# Patient Record
Sex: MALE | Race: WHITE | NOT HISPANIC OR LATINO | Employment: FULL TIME | URBAN - METROPOLITAN AREA
[De-identification: names, ages, dates, MRNs, and addresses within clinical notes are randomized per-mention and may not be internally consistent; named-entity substitution may affect disease eponyms.]

---

## 2017-03-06 DIAGNOSIS — I49.3 PVC'S (PREMATURE VENTRICULAR CONTRACTIONS): ICD-10-CM

## 2017-03-06 DIAGNOSIS — E78.2 MIXED HYPERLIPIDEMIA: ICD-10-CM

## 2017-03-06 NOTE — TELEPHONE ENCOUNTER
Routing refill request to provider for review/approval because:  Labs not current:  [facility] 2015    Thank you  Nannette GUZMAN RN

## 2017-03-06 NOTE — TELEPHONE ENCOUNTER
Metoprolol    Last Written Prescription Date: 03/16/16  Last Fill Quantity: 90, # refills: 3    Last Office Visit with [facility], [facility] or [facility] prescribing provider:  11/17/16   Future Office Visit:    Next 5 appointments (look out 90 days)     Mar 07, 2017 10:30 AM CST   Return Visit with VIKY Diaz MD   [facility] ([facility]    [address] [address]   [phone]                    BP Readings from Last 3 Encounters:   11/17/16 130/81   09/14/16 134/80   08/29/16 130/80     Atorvastatin         Last Written Prescription Date: 03/16/16  Last Fill Quantity: 90, # refills: 3    Last Office Visit with [facility], [facility] or [facility] prescribing provider:  11/17/16   Future Office Visit:    Next 5 appointments (look out 90 days)     Mar 07, 2017 10:30 AM CST   Return Visit with VIKY Diaz MD   [facility] ([facility]    [address] [address]   [phone]                  BP Readings from Last 3 Encounters:   11/17/16 130/81   09/14/16 134/80   08/29/16 130/80     Lab Results   Component Value Date    ALT 78 05/21/2015     Lab Results   Component Value Date    CHOL 148 05/21/2015     Lab Results   Component Value Date    HDL 57 05/21/2015     Lab Results   Component Value Date    LDL 79 05/21/2015     Lab Results   Component Value Date    TRIG 62 05/21/2015     Lab Results   Component Value Date    CHOLHDLRATIO 2.6 05/21/2015

## 2017-03-07 ENCOUNTER — OFFICE VISIT (OUTPATIENT)
Dept: UROLOGY | Facility: CLINIC | Age: 57
End: 2017-03-07
Payer: COMMERCIAL

## 2017-03-07 VITALS — DIASTOLIC BLOOD PRESSURE: 75 MMHG | TEMPERATURE: 97.6 F | HEART RATE: 58 BPM | SYSTOLIC BLOOD PRESSURE: 135 MMHG

## 2017-03-07 DIAGNOSIS — N40.1 BENIGN PROSTATIC HYPERPLASIA WITH LOWER URINARY TRACT SYMPTOMS, UNSPECIFIED MORPHOLOGY: ICD-10-CM

## 2017-03-07 LAB
ALBUMIN UR-MCNC: NEGATIVE MG/DL
APPEARANCE UR: CLEAR
BILIRUB UR QL STRIP: NEGATIVE
COLOR UR AUTO: YELLOW
GLUCOSE UR STRIP-MCNC: NEGATIVE MG/DL
HGB UR QL STRIP: NEGATIVE
KETONES UR STRIP-MCNC: NEGATIVE MG/DL
LEUKOCYTE ESTERASE UR QL STRIP: NEGATIVE
NITRATE UR QL: NEGATIVE
PH UR STRIP: 6.5 PH (ref 5–7)
PSA SERPL-ACNC: 1.03 UG/L (ref 0–4)
RBC #/AREA URNS AUTO: NORMAL /HPF (ref 0–2)
SP GR UR STRIP: <=1.005 (ref 1–1.03)
URN SPEC COLLECT METH UR: NORMAL
UROBILINOGEN UR STRIP-ACNC: 0.2 EU/DL (ref 0.2–1)
WBC #/AREA URNS AUTO: NORMAL /HPF (ref 0–2)

## 2017-03-07 PROCEDURE — G0103 PSA SCREENING: HCPCS | Performed by: UROLOGY

## 2017-03-07 PROCEDURE — 87086 URINE CULTURE/COLONY COUNT: CPT | Performed by: UROLOGY

## 2017-03-07 PROCEDURE — 99213 OFFICE O/P EST LOW 20 MIN: CPT | Mod: 25 | Performed by: UROLOGY

## 2017-03-07 PROCEDURE — 36415 COLL VENOUS BLD VENIPUNCTURE: CPT | Performed by: UROLOGY

## 2017-03-07 PROCEDURE — 51798 US URINE CAPACITY MEASURE: CPT | Performed by: UROLOGY

## 2017-03-07 PROCEDURE — 81001 URINALYSIS AUTO W/SCOPE: CPT | Performed by: UROLOGY

## 2017-03-07 RX ORDER — TERAZOSIN 2 MG/1
2 CAPSULE ORAL 2 TIMES DAILY
Qty: 180 CAPSULE | Refills: 3 | Status: SHIPPED | OUTPATIENT
Start: 2017-03-07 | End: 2017-12-04

## 2017-03-07 NOTE — NURSING NOTE
Active order to obtain bladder scan? Yes   Name of ordering provider:  Emily  Bladder scan preformed post void No  Bladder scan reveled 86ML  Provider notified?  Yes    Natalie Au, VICKIEN

## 2017-03-07 NOTE — PROGRESS NOTES
Appointment source: Established Patient  Patient name: Justin CRONIN Evelyn  Urology Staff: Mykel Diaz MD    Subjective: This is a 57 year old year old male returning for follow up of bladder outlet obstruction symptoms.    Objective:  Voiding without problems on terazosin 2 mg daily. Only observation is that flow of urine is a little slow.    Post void residual is 86 cc.    Prostate benign to palpation    Assessment:  Doing satisfactorily taking terazosin 2 mg daily for management of bladder outlet obstruction symptoms.    Plan:  Will try increasing Terazosin dose to 2 mg bid to assess response in an effort to increase flow rate of urination.    Return in one year for follow up.     Total time 20 minutes, counseling 15 minutes discussing BPH

## 2017-03-07 NOTE — MR AVS SNAPSHOT
After Visit Summary   3/7/2017    Justin Rivas    MRN: [id number]           Patient Information     Date Of Birth          1960        Visit Information        Provider Department      3/7/2017 10:30 AM VIKY Diaz MD [facility]        Today's Diagnoses     Benign prostatic hyperplasia with lower urinary tract symptoms, unspecified morphology           Follow-ups after your visit        Who to contact     If you have questions or need follow up information about today's clinic visit or your schedule please contact [facility] directly at [phone].  Normal or non-critical lab and imaging results will be communicated to you by [employer], letter or phone within 4 business days after the clinic has received the results. If you do not hear from us within 7 days, please contact the clinic through [employer] or phone. If you have a critical or abnormal lab result, we will notify you by phone as soon as possible.  Submit refill requests through [employer] or call your pharmacy and they will forward the refill request to us. Please allow 3 business days for your refill to be completed.          Additional Information About Your Visit        MyChart Information     [employer] gives you secure access to your electronic health record. If you see a primary care provider, you can also send messages to your care team and make appointments. If you have questions, please call your primary care clinic.  If you do not have a primary care provider, please call [phone] and they will assist you.        Care EveryWhere ID     This is your Care EveryWhere ID. This could be used by other organizations to access your [facility] medical records  [id number]        Your Vitals Were     Pulse Temperature                58 97.6  F (36.4  C) (Tympanic)           Blood Pressure from Last 3 Encounters:   03/07/17 135/75   11/17/16 130/81   09/14/16 134/80    Weight from Last 3 Encounters:    11/17/16 177 lb 6.4 oz (80.5 kg)   06/02/16 170 lb (77.1 kg)   05/05/16 176 lb 9.6 oz (80.1 kg)              We Performed the Following     MEASURE POST-VOID RESIDUAL URINE/BLADDER CAPACITY, US NON-IMAGING     PSA, screen     UA with Microscopic     Urine Culture Aerobic Bacterial          Today's Medication Changes          These changes are accurate as of: 3/7/17 11:57 AM.  If you have any questions, ask your nurse or doctor.               These medicines have changed or have updated prescriptions.        Dose/Directions    terazosin 2 MG capsule   Commonly known as:  HYTRIN   This may have changed:  when to take this   Used for:  Benign prostatic hyperplasia with lower urinary tract symptoms, unspecified morphology   Changed by:  VIKY Diaz MD        Dose:  2 mg   Take 1 capsule (2 mg) by mouth 2 times daily   Quantity:  180 capsule   Refills:  3            Where to get your medicines      These medications were sent to [id number] - [place] [address], [facility] [address]     Phone:  [phone]     terazosin 2 MG capsule                Primary Care Provider Office Phone # Fax #    Leatha Holliday,  [phone]       [facility] [address] [place] [id number]        Thank you!     Thank you for choosing [facility]  for your care. Our goal is always to provide you with excellent care. Hearing back from our patients is one way we can continue to improve our services. Please take a few minutes to complete the written survey that you may receive in the mail after your visit with us. Thank you!             Your Updated Medication List - Protect others around you: Learn how to safely use, store and throw away your medicines at www.disposemymeds.org.          This list is accurate as of: 3/7/17 11:57 AM.  Always use your most recent med list.                   Brand Name Dispense Instructions for  use    acetaminophen 325 MG tablet    TYLENOL     Take 2 tablets by mouth every 6 hours as needed.       aspirin EC 81 MG EC tablet      Take 1 tablet (81 mg) by mouth daily       atorvastatin 20 MG tablet    LIPITOR    90 tablet    Take 1 tablet (20 mg) by mouth At Bedtime       azithromycin 250 MG tablet    ZITHROMAX    6 tablet    Two tablets first day, then one tablet daily for four days.       calcium + D 600-200 MG-UNIT Tabs   Generic drug:  calcium carbonate-vitamin D     100 tablet    Take 1 tablet by mouth daily.       cycloSPORINE 0.05 % ophthalmic emulsion    RESTASIS    180 mL    Apply 1 drop to eye 2 times daily       fish oil-omega-3 fatty acids 1000 MG capsule      1 CAPSULE ORALLY DAILY       ibuprofen 200 MG tablet    ADVIL/MOTRIN     Take 400 mg by mouth every 4 hours as needed.       lidocaine visc 2% 2.5mL/5mL & maalox/mylanta w/ simeth 2.5mL/5mL & diphenhydrAMINE 5mg/5mL Susp suspension    MAGIC Mouthwash    100 mL    Swish and spit 10 mLs in mouth every 6 hours as needed for sore throat       meloxicam 7.5 MG tablet    MOBIC    30 tablet    Take 1 tablet (7.5 mg) by mouth daily       metoprolol 50 MG 24 hr tablet    TOPROL-XL    90 tablet    Take 1 tablet (50 mg) by mouth daily       MULTI vitamin  MENS Tabs      one daily       order for DME     1 Device    Trilok Ankle Brace       SB SALINE NOSE NA      Spray  in nostril. BID two sprays, per Dr Arce.       terazosin 2 MG capsule    HYTRIN    180 capsule    Take 1 capsule (2 mg) by mouth 2 times daily

## 2017-03-08 RX ORDER — ATORVASTATIN CALCIUM 20 MG/1
20 TABLET, FILM COATED ORAL AT BEDTIME
Qty: 90 TABLET | Refills: 3 | Status: SHIPPED | OUTPATIENT
Start: 2017-03-08 | End: 2018-03-05

## 2017-03-08 RX ORDER — METOPROLOL SUCCINATE 50 MG/1
50 TABLET, EXTENDED RELEASE ORAL DAILY
Qty: 90 TABLET | Refills: 3 | Status: SHIPPED | OUTPATIENT
Start: 2017-03-08 | End: 2017-03-22

## 2017-03-08 NOTE — TELEPHONE ENCOUNTER
Message left for patient to check the pharmacy.  Due for office visit and labs for further refills  Lexi ANGEL RN

## 2017-03-09 LAB
BACTERIA SPEC CULT: NO GROWTH
MICRO REPORT STATUS: NORMAL
SPECIMEN SOURCE: NORMAL

## 2017-03-22 ENCOUNTER — OFFICE VISIT (OUTPATIENT)
Dept: FAMILY MEDICINE | Facility: CLINIC | Age: 57
End: 2017-03-22
Payer: COMMERCIAL

## 2017-03-22 VITALS
SYSTOLIC BLOOD PRESSURE: 105 MMHG | BODY MASS INDEX: 23.6 KG/M2 | HEIGHT: 72 IN | DIASTOLIC BLOOD PRESSURE: 67 MMHG | HEART RATE: 66 BPM | WEIGHT: 174.2 LBS | TEMPERATURE: 96.6 F

## 2017-03-22 DIAGNOSIS — I25.10 CORONARY ARTERY DISEASE INVOLVING NATIVE CORONARY ARTERY OF NATIVE HEART WITHOUT ANGINA PECTORIS: ICD-10-CM

## 2017-03-22 DIAGNOSIS — E78.2 MIXED HYPERLIPIDEMIA: Chronic | ICD-10-CM

## 2017-03-22 DIAGNOSIS — Z11.59 NEED FOR HEPATITIS C SCREENING TEST: ICD-10-CM

## 2017-03-22 DIAGNOSIS — Z12.11 SPECIAL SCREENING FOR MALIGNANT NEOPLASMS, COLON: Primary | ICD-10-CM

## 2017-03-22 DIAGNOSIS — M75.52 BURSITIS, SHOULDER, LEFT: ICD-10-CM

## 2017-03-22 DIAGNOSIS — I49.3 PVC'S (PREMATURE VENTRICULAR CONTRACTIONS): ICD-10-CM

## 2017-03-22 PROCEDURE — 99214 OFFICE O/P EST MOD 30 MIN: CPT | Performed by: INTERNAL MEDICINE

## 2017-03-22 RX ORDER — METOPROLOL SUCCINATE 25 MG/1
25 TABLET, EXTENDED RELEASE ORAL 2 TIMES DAILY
Qty: 90 TABLET | Refills: 3 | Status: SHIPPED | OUTPATIENT
Start: 2017-03-22 | End: 2017-06-06

## 2017-03-22 NOTE — PROGRESS NOTES
"  SUBJECTIVE:                                                    Justin Rivas is a 57 year old male who presents to clinic today for the following health issues:      Chief Complaint   Patient presents with     Recheck Medication     Metoprolol, Atorvastatin ( patient is NOT fsting this morning)      Musculoskeletal Problem     Left Upper Arm Pain After A Fall 1 month ago     Medication Reconciliation     patient is taking Metorolol 50mg 1/2 tablet twice daily per Dr. Peters Cardiologist      Health Maintenance     Reminded due for colonoscopy        Hyperlipidemia Follow-Up      Rate your low fat/cholesterol diet?: poor    Taking statin?  Yes, no muscle aches from statin    Other lipid medications/supplements?:  Fish oil/Omega 3, dose 1000mg without side effects       Amount of exercise or physical activity: 3 days/week for an average of 20minutes    Problems taking medications regularly: No    Medication side effects: none    Diet: regular (no restrictions)    Medication Followup of Metoprolol    Taking Medication as prescribed: Dr. Peters told him to take metoprolol XL 50mg 1/2 tablet twice per day     Side Effects:  None    Medication Helping Symptoms:  Yes         Joint Pain -Left Upper Arm      Onset: 1 month ago after a fall     Description:   Location: Left Upper Arm    Character: Dull ache, hurts worse with certain movements, or laying on that arm    Intensity: mild    Progression of Symptoms: same    Accompanying Signs & Symptoms:  Other symptoms: none   History:   Previous similar pain: no       Precipitating factors:   Trauma or overuse: YES- patient fell, fell onto Left Arm , Slipped on \"something\"    Alleviating factors:  Improved by: nothing       Therapies Tried and outcome: Ibu- helped     Having pain with bringing arm > 90.      Hearing 'snapping' and crunching with certain movements.    Pain is improving from initial injury, but still has pain.      No pain at rest, only with certain movements " "and sleeping.     Current Outpatient Prescriptions   Medication Sig Dispense Refill     Probiotic Product ([employer] PO) 1 capsule daily       metoprolol (TOPROL-XL) 50 MG 24 hr tablet Take 1 tablet (50 mg) by mouth daily 90 tablet 3     atorvastatin (LIPITOR) 20 MG tablet Take 1 tablet (20 mg) by mouth At Bedtime 90 tablet 3     terazosin (HYTRIN) 2 MG capsule Take 1 capsule (2 mg) by mouth 2 times daily 180 capsule 3     cycloSPORINE (RESTASIS) 0.05 % ophthalmic emulsion Apply 1 drop to eye 2 times daily 180 mL 7     order for DME Trilok Ankle Brace 1 Device 0     aspirin EC 81 MG tablet Take 1 tablet (81 mg) by mouth daily       acetaminophen (TYLENOL) 325 MG tablet Take 2 tablets by mouth every 6 hours as needed.       ibuprofen (ADVIL,MOTRIN) 200 MG tablet Take 400 mg by mouth every 4 hours as needed.       SB SALINE NOSE NA Spray  in nostril. BID two sprays, per Dr Arce.        Calcium Carbonate-Vitamin D (CALCIUM + D) 600-200 MG-UNIT per tablet Take 1 tablet by mouth daily. 100 tablet 12     fish oil-omega-3 fatty acids (FISH OIL) 1000 MG capsule 1 CAPSULE ORALLY DAILY       MULTI VITAMIN MENS OR TABS one daily  0     meloxicam (MOBIC) 7.5 MG tablet Take 1 tablet (7.5 mg) by mouth daily (Patient not taking: Reported on 3/22/2017) 30 tablet 1     Magic Mouthwash (FV std formula) lidocaine visc 2% 2.5mL/5mL & maalox/mylanta w/ simeth 2.5mL/5mL & diphenhydrAMINE 5mg/5mL Swish and spit 10 mLs in mouth every 6 hours as needed for sore throat (Patient not taking: Reported on 3/22/2017) 100 mL 1       ROS:  Constitutional, HEENT, cardiovascular, pulmonary, gi and gu systems are negative, except as otherwise noted.    OBJECTIVE:                                                    /67 (BP Location: Right arm, Patient Position: Chair, Cuff Size: Adult Large)  Pulse 66  Temp 96.6  F (35.9  C) (Tympanic)  Ht 5' 11.75\" (1.822 m)  Wt 174 lb 3.2 oz (79 kg)  BMI 23.79 kg/m2  Body mass index is " 23.79 kg/(m^2).  GENERAL APPEARANCE: healthy, alert and no distress  RESP: lungs clear to auscultation - no rales, rhonchi or wheezes  CV: regular rates and rhythm, normal S1 S2, no S3 or S4 and no murmur, click or rub  ORTHO:   SHOULDER Exam-Left   Inspection: no swelling, no bruising, no discoloration, no obvious deformity, no asymmetry, no glenohumeral joint anterior bulge, no distal clavicle elevation, no muscle atrophy, no scapular winging   Tenderness of: SC joint- no , clavicle(prox-mid)- no , clavicle-(mid-distal)- no , AC joint- no , acromion- no , anterior capsule- YES, prox bicep tendon- YES, greater tuberosity- YES, prox humerus- no , supraspinatous- no , infraspinatous- no , superior trapezious- no , rhomboids- no    Range of Motion: Active- forward flexion- 120, abduction- 120, external rotation- normal, internal rotation- normal  Range of Motion: Passive- forward flexion- normal, abduction- normal, external rotation- normal, internal rotation- normal   Strength: forward flexion- 5/5, abduction- 5/5, internal rotation- 5/5, external rotation- 5/5 and bicep- full   Special tests:         ASSESSMENT/PLAN:                                                    1. Special screening for malignant neoplasms, colon - patient will schedule  - GASTROENTEROLOGY ADULT REF PROCEDURE ONLY    2. PVC's (premature ventricular contractions) - change from half of 50 mg twice a day to 25 mg twice a day  - metoprolol (TOPROL-XL) 25 MG 24 hr tablet; Take 1 tablet (25 mg) by mouth 2 times daily  Dispense: 90 tablet; Refill: 3    3. Mixed hyperlipidemia - due for labs  - Lipid Profile (Chol, Trig, HDL, LDL calc); Future    4. Coronary artery disease involving native coronary artery of native heart without angina pectoris- due for labs, no symptoms  - Comprehensive metabolic panel; Future    5. Need for hepatitis C screening test - requested by patient  - Hepatitis C antibody; Future    6. Bursitis, shoulder, left- NSAIDs, ice,  modified activity. Allow for 2-4 weeks for healing, if no improvement, patient can call in for referral to sports medicine    Return to clinic  1 year or PRN    Leatha Holliday,   [facility]

## 2017-03-22 NOTE — MR AVS SNAPSHOT
After Visit Summary   3/22/2017    Justin Rivas    MRN: [id number]           Patient Information     Date Of Birth          1960        Visit Information        Provider Department      3/22/2017 10:00 AM Leatha Holliday, DO [facility]        Today's Diagnoses     Special screening for malignant neoplasms, colon    -  1    PVC's (premature ventricular contractions)        Mixed hyperlipidemia        Coronary artery disease involving native coronary artery of native heart without angina pectoris        Need for hepatitis C screening test          Care Instructions          Thank you for choosing [facility] (formerly [facility]).  You may be receiving a survey in the mail from Angel Raines regarding your visit today.  Please take a few minutes to complete and return the survey to let us know how we are doing.      If you have questions or concerns, please contact us via [employer] or you can contact your care team at [phone].    Our Clinic hours are:  Monday 6:40 am  to 7:00 pm  Tuesday -Friday 6:40 am to 5:00 pm    The [place] outpatient lab hours are:  Monday - Friday 6:10 am to 4:45 pm  Saturdays 7:00 am to 11:00 am  Appointments are required, call [phone]    If you have clinical questions after hours or would like to schedule an appointment,  call the clinic at [phone].    1. Change from 1/2 of 50 mg pill to whole of 25 mg pill twice daily.  2. Come back for fasting blood work.  3. Return to clinic 1 year, sooner if needed.  4. Get colonoscopy  5. Check with insurance about shingles vaccine.  You do not need pneumonia vaccine until age 65  6. If the shoulder gets worse, call in for referral to sports medicine        Bursitis  You have bursitis. This is an inflammation of the bursa. These are small, fluid-filled sacs that surround the larger joints of the body. The bursa help the muscles and tendons move smoothly over the joints.  Bursitis often happens in the shoulder. But it  can also affect the elbows, hips, pelvis, knees, toes, and heels. Bursitis can be caused by injury, overuse of the joint, or infection of the bursa. Symptoms include pain and tenderness over a joint. Symptoms get worse with movement.  Bursitis is treated with an anti-inflammatory medicine and by resting the joint. More severe cases require injection of medicine directly into the bursa.    Home care    Rest the painful joint and protect it from movement. This will allow the inflammation to heal faster.    Apply an ice pack over the injured area for no more than 15 to 20 minutes. Do this every 3 to 6 hours for the first 24 to 48 hours. Keep using ice packs 3 to 4 times a day until the pain and swelling improves.     To make an ice pack, put ice cubes in a sealed plastic zip-lock bag. Wrap the bag in a clean, thin towel or cloth. Never put ice or an ice pack directly on the skin. As the ice melts, be careful to avoid getting any wrap or splint wet.    You may take over-the-counter pain medicine to treat pain and inflammation, unless another medicine was prescribed. Anti-inflammatory pain medicines may be more effective. Talk with your provider beforeusing these medicines if you have chronic liver or kidney disease, or ever had a stomach ulcer or GI (gastrointestinal) bleeding.    As your symptoms improve, slowly begin to move the joint. Do not overuse the joint. This may cause the symptoms to flare up again.  When to seek medical advice  Call your healthcare provider right away if any of these occur:    Redness over the painful area    Increasing pain or swelling at the joint    Fever of 100.4 F (38 C) or above lasting for 24 to 48 hours    5442-2452 [employer]. [address] [place] [address]. All rights reserved. This information is not intended as a substitute for professional medical care. Always follow your healthcare professional's instructions.              Follow-ups after your visit         Additional Services     GASTROENTEROLOGY ADULT REF PROCEDURE ONLY       Last Lab Result: Creatinine (mg/dL)       Date                     Value                 05/21/2015               0.89             ----------  There is no height or weight on file to calculate BMI.     Needed:  No  Language:  English    Patient will be contacted to schedule procedure.     Please be aware that coverage of these services is subject to the terms and limitations of your health insurance plan.  Call member services at your health plan with any benefit or coverage questions.  Any procedures must be performed at a [facility] facility OR coordinated by your clinic's referral office.    Please bring the following with you to your appointment:    (1) Any X-Rays, CTs or MRIs which have been performed.  Contact the facility where they were done to arrange for  prior to your scheduled appointment.    (2) List of current medications   (3) This referral request   (4) Any documents/labs given to you for this referral                  Future tests that were ordered for you today     Open Future Orders        Priority Expected Expires Ordered    Comprehensive metabolic panel Routine  3/22/2018 3/22/2017    Lipid Profile (Chol, Trig, HDL, LDL calc) Routine  3/22/2018 3/22/2017    Hepatitis C antibody Routine  3/22/2018 3/22/2017            Who to contact     If you have questions or need follow up information about today's clinic visit or your schedule please contact [facility] directly at [phone].  Normal or non-critical lab and imaging results will be communicated to you by MyChart, letter or phone within 4 business days after the clinic has received the results. If you do not hear from us within 7 days, please contact the clinic through MyChart or phone. If you have a critical or abnormal lab result, we will notify you by phone as soon as possible.  Submit refill requests through [employer] or call your  "pharmacy and they will forward the refill request to us. Please allow 3 business days for your refill to be completed.          Additional Information About Your Visit        CAPNIAhart Information     [employer] gives you secure access to your electronic health record. If you see a primary care provider, you can also send messages to your care team and make appointments. If you have questions, please call your primary care clinic.  If you do not have a primary care provider, please call [phone] and they will assist you.        Care EveryWhere ID     This is your Care EveryWhere ID. This could be used by other organizations to access your [place] medical records  [id number]        Your Vitals Were     Pulse Temperature Height BMI (Body Mass Index)          66 96.6  F (35.9  C) (Tympanic) 5' 11.75\" (1.822 m) 23.79 kg/m2         Blood Pressure from Last 3 Encounters:   03/22/17 105/67   03/07/17 135/75   11/17/16 130/81    Weight from Last 3 Encounters:   03/22/17 174 lb 3.2 oz (79 kg)   11/17/16 177 lb 6.4 oz (80.5 kg)   06/02/16 170 lb (77.1 kg)              We Performed the Following     GASTROENTEROLOGY ADULT REF PROCEDURE ONLY          Today's Medication Changes          These changes are accurate as of: 3/22/17 10:39 AM.  If you have any questions, ask your nurse or doctor.               These medicines have changed or have updated prescriptions.        Dose/Directions    metoprolol 25 MG 24 hr tablet   Commonly known as:  TOPROL-XL   This may have changed:    - medication strength  - how much to take  - when to take this   Used for:  PVC's (premature ventricular contractions)   Changed by:  Leatha Holliday DO        Dose:  25 mg   Take 1 tablet (25 mg) by mouth 2 times daily   Quantity:  90 tablet   Refills:  3            Where to get your medicines      These medications were sent to [id number] - [place], [place] - [address] [place] [address] [address], [facility] [address]    "  Phone:  [phone]     metoprolol 25 MG 24 hr tablet                Primary Care Provider Office Phone # Fax #    Leatha Holliday [phone]       [facility] [address] [facility] [address]        Thank you!     Thank you for choosing [facility]  for your care. Our goal is always to provide you with excellent care. Hearing back from our patients is one way we can continue to improve our services. Please take a few minutes to complete the written survey that you may receive in the mail after your visit with us. Thank you!             Your Updated Medication List - Protect others around you: Learn how to safely use, store and throw away your medicines at www.disposemymeds.org.          This list is accurate as of: 3/22/17 10:39 AM.  Always use your most recent med list.                   Brand Name Dispense Instructions for use    acetaminophen 325 MG tablet    TYLENOL     Take 2 tablets by mouth every 6 hours as needed.       aspirin EC 81 MG EC tablet      Take 1 tablet (81 mg) by mouth daily       atorvastatin 20 MG tablet    LIPITOR    90 tablet    Take 1 tablet (20 mg) by mouth At Bedtime       calcium + D 600-200 MG-UNIT Tabs   Generic drug:  calcium carbonate-vitamin D     100 tablet    Take 1 tablet by mouth daily.       cycloSPORINE 0.05 % ophthalmic emulsion    RESTASIS    180 mL    Apply 1 drop to eye 2 times daily       fish oil-omega-3 fatty acids 1000 MG capsule      1 CAPSULE ORALLY DAILY       ibuprofen 200 MG tablet    ADVIL/MOTRIN     Take 400 mg by mouth every 4 hours as needed.       lidocaine visc 2% 2.5mL/5mL & maalox/mylanta w/ simeth 2.5mL/5mL & diphenhydrAMINE 5mg/5mL Susp suspension    MAGIC Mouthwash    100 mL    Swish and spit 10 mLs in mouth every 6 hours as needed for sore throat       meloxicam 7.5 MG tablet    MOBIC    30 tablet    Take 1 tablet (7.5 mg) by mouth daily       metoprolol 25 MG 24 hr tablet    TOPROL-XL    90  tablet    Take 1 tablet (25 mg) by mouth 2 times daily       MULTI vitamin  MENS Tabs      one daily       order for DME     1 Device    TriloEmitless PO      1 capsule daily       SB SALINE NOSE NA      Spray  in nostril. BID two sprays, per Dr Arce.       terazosin 2 MG capsule    HYTRIN    180 capsule    Take 1 capsule (2 mg) by mouth 2 times daily

## 2017-03-22 NOTE — NURSING NOTE
"Chief Complaint   Patient presents with     Recheck Medication     Metoprolol, Atorvastatin ( patient is NOT fsting this morning)      Musculoskeletal Problem     Left Upper Arm Pain After A Fall 1 month ago     Medication Reconciliation     patient is taking Metorolol 50mg 1/2 tablet twice daily per Dr. Peters Cardiologist      Health Maintenance     Reminded due for colonoscopy        Initial /67 (BP Location: Right arm, Patient Position: Chair, Cuff Size: Adult Large)  Pulse 66  Temp 96.6  F (35.9  C) (Tympanic)  Ht 5' 11.75\" (1.822 m)  Wt 174 lb 3.2 oz (79 kg)  BMI 23.79 kg/m2 Estimated body mass index is 23.79 kg/(m^2) as calculated from the following:    Height as of this encounter: 5' 11.75\" (1.822 m).    Weight as of this encounter: 174 lb 3.2 oz (79 kg).  Medication Reconciliation: complete    "

## 2017-03-23 DIAGNOSIS — I25.10 CORONARY ARTERY DISEASE INVOLVING NATIVE CORONARY ARTERY OF NATIVE HEART WITHOUT ANGINA PECTORIS: ICD-10-CM

## 2017-03-23 DIAGNOSIS — Z11.59 NEED FOR HEPATITIS C SCREENING TEST: ICD-10-CM

## 2017-03-23 DIAGNOSIS — E78.2 MIXED HYPERLIPIDEMIA: Chronic | ICD-10-CM

## 2017-03-23 LAB
ALBUMIN SERPL-MCNC: 3.7 G/DL (ref 3.4–5)
ALP SERPL-CCNC: 52 U/L (ref 40–150)
ALT SERPL W P-5'-P-CCNC: 41 U/L (ref 0–70)
ANION GAP SERPL CALCULATED.3IONS-SCNC: 5 MMOL/L (ref 3–14)
AST SERPL W P-5'-P-CCNC: 40 U/L (ref 0–45)
BILIRUB SERPL-MCNC: 0.4 MG/DL (ref 0.2–1.3)
BUN SERPL-MCNC: 24 MG/DL (ref 7–30)
CALCIUM SERPL-MCNC: 8.8 MG/DL (ref 8.5–10.1)
CHLORIDE SERPL-SCNC: 110 MMOL/L (ref 94–109)
CHOLEST SERPL-MCNC: 186 MG/DL
CO2 SERPL-SCNC: 29 MMOL/L (ref 20–32)
CREAT SERPL-MCNC: 1.02 MG/DL (ref 0.66–1.25)
GFR SERPL CREATININE-BSD FRML MDRD: 76 ML/MIN/1.7M2
GLUCOSE SERPL-MCNC: 102 MG/DL (ref 70–99)
HDLC SERPL-MCNC: 57 MG/DL
LDLC SERPL CALC-MCNC: 114 MG/DL
NONHDLC SERPL-MCNC: 129 MG/DL
POTASSIUM SERPL-SCNC: 4 MMOL/L (ref 3.4–5.3)
PROT SERPL-MCNC: 7.6 G/DL (ref 6.8–8.8)
SODIUM SERPL-SCNC: 144 MMOL/L (ref 133–144)
TRIGL SERPL-MCNC: 75 MG/DL

## 2017-03-23 PROCEDURE — 86803 HEPATITIS C AB TEST: CPT | Performed by: INTERNAL MEDICINE

## 2017-03-23 PROCEDURE — 36415 COLL VENOUS BLD VENIPUNCTURE: CPT | Performed by: INTERNAL MEDICINE

## 2017-03-23 PROCEDURE — 80061 LIPID PANEL: CPT | Performed by: INTERNAL MEDICINE

## 2017-03-23 PROCEDURE — 80053 COMPREHEN METABOLIC PANEL: CPT | Performed by: INTERNAL MEDICINE

## 2017-03-24 LAB — HCV AB SERPL QL IA: NORMAL

## 2017-06-06 ENCOUNTER — OFFICE VISIT (OUTPATIENT)
Dept: CARDIOLOGY | Facility: CLINIC | Age: 57
End: 2017-06-06
Attending: INTERNAL MEDICINE
Payer: COMMERCIAL

## 2017-06-06 VITALS
OXYGEN SATURATION: 97 % | SYSTOLIC BLOOD PRESSURE: 118 MMHG | BODY MASS INDEX: 23.95 KG/M2 | DIASTOLIC BLOOD PRESSURE: 72 MMHG | HEART RATE: 61 BPM | WEIGHT: 175.4 LBS

## 2017-06-06 DIAGNOSIS — I49.3 PVC'S (PREMATURE VENTRICULAR CONTRACTIONS): ICD-10-CM

## 2017-06-06 DIAGNOSIS — I25.10 CORONARY ARTERY DISEASE DUE TO CALCIFIED CORONARY LESION: ICD-10-CM

## 2017-06-06 DIAGNOSIS — I25.84 CORONARY ARTERY DISEASE DUE TO CALCIFIED CORONARY LESION: ICD-10-CM

## 2017-06-06 PROCEDURE — 99214 OFFICE O/P EST MOD 30 MIN: CPT | Performed by: INTERNAL MEDICINE

## 2017-06-06 RX ORDER — METOPROLOL SUCCINATE 25 MG/1
25 TABLET, EXTENDED RELEASE ORAL DAILY
Qty: 90 TABLET | Refills: 3
Start: 2017-06-06 | End: 2018-04-26

## 2017-06-06 NOTE — PROGRESS NOTES
HISTORY OF PRESENT ILLNESS:  Mr. Rivas is a pleasant 57-year-old gentleman with a history of nonobstructive coronary artery disease diagnosed on a cardiac CT angiogram along with frequent PVCs, which has responded to beta blocker therapy who returns in annual followup.      Previously the patient was noted to have benign nodules on his CT scan and we did a repeat CT scan that was unchanged.  The radiologist had recommended no further imaging.      The patient has been doing well from a cardiovascular standpoint, denying any chest pain, pressure, shortness of breath, orthopnea or paroxysmal nocturnal dyspnea.  He has noticed an improvement in his PVC burden when he has cut his metoprolol dose in half.      A year ago, the patient was having multiple noncardiovascular complaints and wondered if this could be related to his atorvastatin.  He did go on a 2-week drug holiday and had no change in his symptoms and he has since gone back on his atorvastatin which he has been tolerating well.      The patient's most recent lipids are from earlier this year and show an LDL of 114.      Please see my separate note with his full physical examination.      IMPRESSION AND PLAN:   1.  Coronary artery disease - Mr. Rivas is known to have mild nonobstructive coronary artery disease based on a cardiac CT angiogram which we are treating medically.  He is clinically asymptomatic and I will continue his aspirin and statin unchanged for secondary prevention.   2.  Frequent PVCs - the patient has actually done better since I have seen him last.  We have cut his metoprolol succinate dose to 25 mg daily.  He was instructed that he could take an extra dose of metoprolol should his palpitations be bothersome on any particular day.      Mr. Rivas is clinically stable and I will plan to see him back in routine clinical followup in 1 year.         MACARENA CARDENAS MD             D: 06/06/2017 11:25   T: 06/06/2017 14:40   MT: MARIA GUADALUPE      Name:      RAVINDER NARVAEZ   MRN:      [id number]        Account:      [id number]   :      1960           Service Date: 2017      Document: [id number]

## 2017-06-06 NOTE — MR AVS SNAPSHOT
After Visit Summary   6/6/2017    Justin Rivas    MRN: [id number]           Patient Information     Date Of Birth          1960        Visit Information        Provider Department      6/6/2017 11:00 AM Jose Rod MD [facility] AT [facility]        Today's Diagnoses     Coronary artery disease due to calcified coronary lesion        PVC's (premature ventricular contractions)           Follow-ups after your visit        Additional Services     Follow-Up with Cardiologist                 Future tests that were ordered for you today     Open Future Orders        Priority Expected Expires Ordered    Follow-Up with Cardiologist Routine 6/6/2018 10/19/2018 6/6/2017            Who to contact     If you have questions or need follow up information about today's clinic visit or your schedule please contact [facility] directly at [phone].  Normal or non-critical lab and imaging results will be communicated to you by [employer] Mobile[employer], letter or phone within 4 business days after the clinic has received the results. If you do not hear from us within 7 days, please contact the clinic through [employer] or phone. If you have a critical or abnormal lab result, we will notify you by phone as soon as possible.  Submit refill requests through [employer] or call your pharmacy and they will forward the refill request to us. Please allow 3 business days for your refill to be completed.          Additional Information About Your Visit        MyChart Information     [employer] gives you secure access to your electronic health record. If you see a primary care provider, you can also send messages to your care team and make appointments. If you have questions, please call your primary care clinic.  If you do not have a primary care provider, please call [phone] and they will assist you.        Care EveryWhere ID     This is your Care  EveryWhere ID. This could be used by other organizations to access your [place] medical records  [id number]        Your Vitals Were     Pulse Pulse Oximetry BMI (Body Mass Index)             61 97% 23.95 kg/m2          Blood Pressure from Last 3 Encounters:   06/06/17 118/72   03/22/17 105/67   03/07/17 135/75    Weight from Last 3 Encounters:   06/06/17 79.6 kg (175 lb 6.4 oz)   03/22/17 79 kg (174 lb 3.2 oz)   11/17/16 80.5 kg (177 lb 6.4 oz)              We Performed the Following     Follow-Up with Cardiologist          Today's Medication Changes          These changes are accurate as of: 6/6/17 11:17 AM.  If you have any questions, ask your nurse or doctor.               These medicines have changed or have updated prescriptions.        Dose/Directions    metoprolol 25 MG 24 hr tablet   Commonly known as:  TOPROL-XL   This may have changed:  when to take this   Used for:  PVC's (premature ventricular contractions)   Changed by:  Jose Rod MD        Dose:  25 mg   Take 1 tablet (25 mg) by mouth daily   Quantity:  90 tablet   Refills:  3            Where to get your medicines      Some of these will need a paper prescription and others can be bought over the counter.  Ask your nurse if you have questions.     You don't need a prescription for these medications     metoprolol 25 MG 24 hr tablet                Primary Care Provider Office Phone # Fax #    Leatha HollidayDO [phone]       [address] [address]        Thank you!     Thank you for choosing [facility]YSIC[facility] AT [facility]  for your care. Our goal is always to provide you with excellent care. Hearing back from our patients is one way we can continue to improve our services. Please take a few minutes to complete the written survey that you may receive in the mail after your visit with us. Thank you!             Your Updated Medication List - Protect others  around you: Learn how to safely use, store and throw away your medicines at www.disposemymeds.org.          This list is accurate as of: 6/6/17 11:17 AM.  Always use your most recent med list.                   Brand Name Dispense Instructions for use    acetaminophen 325 MG tablet    TYLENOL     Take 2 tablets by mouth every 6 hours as needed.       aspirin EC 81 MG EC tablet      Take 1 tablet (81 mg) by mouth daily       atorvastatin 20 MG tablet    LIPITOR    90 tablet    Take 1 tablet (20 mg) by mouth At Bedtime       calcium + D 600-200 MG-UNIT Tabs   Generic drug:  calcium carbonate-vitamin D     100 tablet    Take 1 tablet by mouth daily.       cycloSPORINE 0.05 % ophthalmic emulsion    RESTASIS    180 mL    Apply 1 drop to eye 2 times daily       fish oil-omega-3 fatty acids 1000 MG capsule      1 CAPSULE ORALLY DAILY       ibuprofen 200 MG tablet    ADVIL/MOTRIN     Take 400 mg by mouth every 4 hours as needed.       lidocaine visc 2% 2.5mL/5mL & maalox/mylanta w/ simeth 2.5mL/5mL & diphenhydrAMINE 5mg/5mL Susp suspension    [facility]    100 mL    Swish and spit 10 mLs in mouth every 6 hours as needed for sore throat       metoprolol 25 MG 24 hr tablet    TOPROL-XL    90 tablet    Take 1 tablet (25 mg) by mouth daily       MULTI vitamin  MENS Tabs      one daily       order for DME     1 Device    Trilok Ankle Brace       [employer] PO      1 capsule daily       SB SALINE NOSE NA      Spray  in nostril. BID two sprays, per Dr Arce.       terazosin 2 MG capsule    HYTRIN    180 capsule    Take 1 capsule (2 mg) by mouth 2 times daily

## 2017-06-06 NOTE — LETTER
6/6/2017    Leatha Holliday, DO  [facility]   [address] [facility] [address]    RE: Justin Cejamelony       Dear Colleague,    I had the pleasure of seeing Justin Rivas in the [facility].    Mr. Rivas is a pleasant 57-year-old gentleman with a history of nonobstructive coronary artery disease diagnosed on a cardiac CT angiogram along with frequent PVCs, which has responded to beta blocker therapy who returns in annual followup.      Previously the patient was noted to have benign nodules on his CT scan and we did a repeat CT scan that was unchanged.  The radiologist had recommended no further imaging.      The patient has been doing well from a cardiovascular standpoint, denying any chest pain, pressure, shortness of breath, orthopnea or paroxysmal nocturnal dyspnea.  He has noticed an improvement in his PVC burden when he has cut his metoprolol dose in half.      A year ago, the patient was having multiple noncardiovascular complaints and wondered if this could be related to his atorvastatin.  He did go on a 2-week drug holiday and had no change in his symptoms and he has since gone back on his atorvastatin which he has been tolerating well.      The patient's most recent lipids are from earlier this year and show an LDL of 114.      Please see my separate note with his full physical examination.     Outpatient Encounter Prescriptions as of 6/6/2017   Medication Sig Dispense Refill     metoprolol (TOPROL-XL) 25 MG 24 hr tablet Take 1 tablet (25 mg) by mouth daily 90 tablet 3     Probiotic Product (58.com PO) 1 capsule daily       atorvastatin (LIPITOR) 20 MG tablet Take 1 tablet (20 mg) by mouth At Bedtime 90 tablet 3     terazosin (HYTRIN) 2 MG capsule Take 1 capsule (2 mg) by mouth 2 times daily 180 capsule 3     cycloSPORINE (RESTASIS) 0.05 % ophthalmic emulsion Apply 1 drop to eye 2 times daily 180 mL 7     Magic Mouthwash (FV std  formula) lidocaine visc 2% 2.5mL/5mL & maalox/mylanta w/ simeth 2.5mL/5mL & diphenhydrAMINE 5mg/5mL Swish and spit 10 mLs in mouth every 6 hours as needed for sore throat 100 mL 1     aspirin EC 81 MG tablet Take 1 tablet (81 mg) by mouth daily       acetaminophen (TYLENOL) 325 MG tablet Take 2 tablets by mouth every 6 hours as needed.       ibuprofen (ADVIL,MOTRIN) 200 MG tablet Take 400 mg by mouth every 4 hours as needed.       SB SALINE NOSE NA Spray  in nostril. BID two sprays, per Dr Arce.        Calcium Carbonate-Vitamin D (CALCIUM + D) 600-200 MG-UNIT per tablet Take 1 tablet by mouth daily. 100 tablet 12     fish oil-omega-3 fatty acids (FISH OIL) 1000 MG capsule 1 CAPSULE ORALLY DAILY       MULTI VITAMIN MENS OR TABS one daily  0     [DISCONTINUED] metoprolol (TOPROL-XL) 25 MG 24 hr tablet Take 1 tablet (25 mg) by mouth 2 times daily 90 tablet 3     order for [facility]ilok [facility]le Brace 1 Device 0     No facility-administered encounter medications on file as of 6/6/2017.       IMPRESSION AND PLAN:   1.  Coronary artery disease - Mr. Rivas is known to have mild nonobstructive coronary artery disease based on a cardiac CT angiogram which we are treating medically.  He is clinically asymptomatic and I will continue his aspirin and statin unchanged for secondary prevention.   2.  Frequent PVCs - the patient has actually done better since I have seen him last.  We have cut his metoprolol succinate dose to 25 mg daily.  He was instructed that he could take an extra dose of metoprolol should his palpitations be bothersome on any particular day.      Mr. Rivas is clinically stable and I will plan to see him back in routine clinical followup in 1 year.     Again, thank you for allowing me to participate in the care of your patient.      Sincerely,    Jose Rod MD     [facility]

## 2017-06-06 NOTE — PROGRESS NOTES
HPI and Plan:   See dictation    Orders Placed This Encounter   Procedures     Follow-Up with Cardiologist       Orders Placed This Encounter   Medications     metoprolol (TOPROL-XL) 25 MG 24 hr tablet     Sig: Take 1 tablet (25 mg) by mouth daily     Dispense:  90 tablet     Refill:  3       Medications Discontinued During This Encounter   Medication Reason     metoprolol (TOPROL-XL) 25 MG 24 hr tablet Reorder         Encounter Diagnoses   Name Primary?     Coronary artery disease due to calcified coronary lesion      PVC's (premature ventricular contractions)        CURRENT MEDICATIONS:  Current Outpatient Prescriptions   Medication Sig Dispense Refill     metoprolol (TOPROL-XL) 25 MG 24 hr tablet Take 1 tablet (25 mg) by mouth daily 90 tablet 3     Probiotic Product ([employer] PO) 1 capsule daily       atorvastatin (LIPITOR) 20 MG tablet Take 1 tablet (20 mg) by mouth At Bedtime 90 tablet 3     terazosin (HYTRIN) 2 MG capsule Take 1 capsule (2 mg) by mouth 2 times daily 180 capsule 3     cycloSPORINE (RESTASIS) 0.05 % ophthalmic emulsion Apply 1 drop to eye 2 times daily 180 mL 7     Magic Mouthwash (FV std formula) lidocaine visc 2% 2.5mL/5mL & maalox/mylanta w/ simeth 2.5mL/5mL & diphenhydrAMINE 5mg/5mL Swish and spit 10 mLs in mouth every 6 hours as needed for sore throat 100 mL 1     aspirin EC 81 MG tablet Take 1 tablet (81 mg) by mouth daily       acetaminophen (TYLENOL) 325 MG tablet Take 2 tablets by mouth every 6 hours as needed.       ibuprofen (ADVIL,MOTRIN) 200 MG tablet Take 400 mg by mouth every 4 hours as needed.       SB SALINE NOSE NA Spray  in nostril. BID two sprays, per Dr Arce.        Calcium Carbonate-Vitamin D (CALCIUM + D) 600-200 MG-UNIT per tablet Take 1 tablet by mouth daily. 100 tablet 12     fish oil-omega-3 fatty acids (FISH OIL) 1000 MG capsule 1 CAPSULE ORALLY DAILY       MULTI VITAMIN MENS OR TABS one daily  0     [DISCONTINUED] metoprolol (TOPROL-XL) 25 MG 24 hr  tablet Take 1 tablet (25 mg) by mouth 2 times daily 90 tablet 3     order for DME Trilok Ankle Brace 1 Device 0       ALLERGIES     Allergies   Allergen Reactions     Flomax [Tamsulosin] Other (See Comments)     Lightheadedness     Penicillins      Leg became numb after taking PCN as child       PAST MEDICAL HISTORY:  Past Medical History:   Diagnosis Date     BPH (benign prostatic hyperplasia)      Coronary artery disease     on CTa 2/2015     Family history of premature coronary heart disease     father in his 50s     Hyperlipidemia      Hyperlipidemia LDL goal <160 5/15/2013     Other premature beats      Palpitations      Ulcerative proctitis (H)      Varicose veins        PAST SURGICAL HISTORY:  Past Surgical History:   Procedure Laterality Date     ABLATE VEIN VARICOSE RADIO FREQUENCY WITHOUT PHLEBECTOMY MULTIPLE STAB  1/24/2013    Procedure: ABLATE VEIN VARICOSE RADIO FREQUENCY WITHOUT PHLEBECTOMY MULTIPLE STAB;  Left leg ablation -vein mapping at 0830;  Surgeon: Amadeo Porter MD;  Location: [facility]     COLONOSCOPY  3/30/2011    COLONOSCOPY performed by ESHA KNOWLES at [facility]       FAMILY HISTORY:  Family History   Problem Relation Age of Onset     Hypertension Mother      C.A.D. Father      Circulatory Father      blood disorder     CEREBROVASCULAR DISEASE Maternal Grandmother      CEREBROVASCULAR DISEASE Maternal Grandfather      Alzheimer Disease Paternal Grandmother      C.A.D. Paternal Grandmother      C.A.D. Paternal Grandfather      CANCER Brother      lung cancer at age 50     Hypertension Sister      HEART DISEASE Sister        SOCIAL HISTORY:  Social History     Social History     Marital status: Single     Spouse name: N/A     Number of children: N/A     Years of education: N/A     Social History Main Topics     Smoking status: Never Smoker     Smokeless tobacco: Never Used     Alcohol use Yes      Comment: occasional     Drug use: No     Sexual activity: No     Other Topics Concern      Caffeine Concern Yes     1 cup of coffee     Exercise Yes     2-3x a week 10-15min  (total gym)     Seat Belt Yes     Self-Exams Yes     Parent/Sibling W/ Cabg, Mi Or Angioplasty Before 65f 55m? No     Social History Narrative       Review of Systems:  Skin:  Negative       Eyes:  Negative      ENT:  Negative      Respiratory:  Negative       Cardiovascular:    Positive for;palpitations;chest pain    Gastroenterology: Negative      Genitourinary:  Positive for prostate problem;urinary frequency    Musculoskeletal:  Negative      Neurologic:  Negative      Psychiatric:  Negative      Heme/Lymph/Imm:  Negative      Endocrine:  Negative        Physical Exam:  Vitals: /72 (BP Location: Right arm, Patient Position: Chair, Cuff Size: Adult Regular)  Pulse 61  Wt 79.6 kg (175 lb 6.4 oz)  SpO2 97%  BMI 23.95 kg/m2    Constitutional:  cooperative;well developed;in no acute distress        Skin:  warm and dry to the touch        Head:  normocephalic        Eyes:  sclera white        ENT:  no pallor or cyanosis        Neck:  no stiffness        Chest:  clear to auscultation          Cardiac: regular rhythm;normal S1 and S2     no presence of murmur            Abdomen:  abdomen soft        Vascular: pulses full and equal                                        Extremities and Back:  no edema              Neurological:  no gross motor deficits;affect appropriate              CC  Jose Rod MD   [facility]  [address]  [place] [address]

## 2017-06-17 DIAGNOSIS — H04.123 CHRONIC DRYNESS OF BOTH EYES: ICD-10-CM

## 2017-06-19 NOTE — TELEPHONE ENCOUNTER
Cyclosporine      Last Written Prescription Date: 06/03/16  Last Fill Quantity: 180ml,  # refills: 7   Last Office Visit with G, P or [facility] prescribing provider: 03/22/17

## 2017-06-23 ENCOUNTER — TELEPHONE (OUTPATIENT)
Dept: FAMILY MEDICINE | Facility: CLINIC | Age: 57
End: 2017-06-23

## 2017-06-23 RX ORDER — CYCLOSPORINE 0.5 MG/ML
EMULSION OPHTHALMIC
Qty: 180 EACH | Refills: 3 | Status: SHIPPED | OUTPATIENT
Start: 2017-06-23 | End: 2019-02-21

## 2017-06-23 NOTE — TELEPHONE ENCOUNTER
Routing refill request to provider for review/approval because:  Drug not on the [facility] refill protocol     Leatha Edwards RN

## 2017-06-23 NOTE — TELEPHONE ENCOUNTER
Left message for patient to return call to clinic.  CSS - ok to deliver msg below.  Genesis GARCIA RN

## 2017-06-26 NOTE — TELEPHONE ENCOUNTER
Left message for pt to call care team or check with pharmacy   Nicole Carter  [facility]

## 2017-06-26 NOTE — TELEPHONE ENCOUNTER
Pt was notified that we sent a PA for Restasis - He will check with his Ophthalmologist for an alternative

## 2017-06-26 NOTE — TELEPHONE ENCOUNTER
Pt was notified that we sent a PA for Restasis - He will check with his Ophthalmologist for an alternative Rx

## 2017-06-27 NOTE — TELEPHONE ENCOUNTER
"Per insurance \"Please note the requested medication is available as of 6/26/17\".  Pharmacy notified.   It was too late to notify patient.   "

## 2017-12-04 DIAGNOSIS — N40.1 BENIGN PROSTATIC HYPERPLASIA WITH LOWER URINARY TRACT SYMPTOMS, SYMPTOM DETAILS UNSPECIFIED: Primary | ICD-10-CM

## 2017-12-04 DIAGNOSIS — N40.1 BENIGN NON-NODULAR PROSTATIC HYPERPLASIA WITH LOWER URINARY TRACT SYMPTOMS: ICD-10-CM

## 2017-12-04 RX ORDER — TERAZOSIN 2 MG/1
2 CAPSULE ORAL 2 TIMES DAILY
Qty: 180 CAPSULE | Refills: 0 | Status: SHIPPED | OUTPATIENT
Start: 2017-12-04 | End: 2018-03-13

## 2017-12-04 NOTE — TELEPHONE ENCOUNTER
Medication refilled per [facility] RN protocol.    Mariel DEJESUS RN BSN PHN  Specialty Clinics      LOV 3/7/17  Assessment:  Doing satisfactorily taking terazosin 2 mg daily for management of bladder outlet obstruction symptoms.     Plan:  Will try increasing Terazosin dose to 2 mg bid to assess response in an effort to increase flow rate of urination.     Return in one year for follow up.

## 2018-03-05 DIAGNOSIS — I49.3 PVC'S (PREMATURE VENTRICULAR CONTRACTIONS): ICD-10-CM

## 2018-03-05 DIAGNOSIS — E78.2 MIXED HYPERLIPIDEMIA: ICD-10-CM

## 2018-03-05 RX ORDER — METOPROLOL SUCCINATE 25 MG/1
25 TABLET, EXTENDED RELEASE ORAL 2 TIMES DAILY
Qty: 60 TABLET | Refills: 0 | Status: SHIPPED | OUTPATIENT
Start: 2018-03-05 | End: 2018-03-24

## 2018-03-05 RX ORDER — ATORVASTATIN CALCIUM 20 MG/1
20 TABLET, FILM COATED ORAL AT BEDTIME
Qty: 30 TABLET | Refills: 0 | Status: SHIPPED | OUTPATIENT
Start: 2018-03-05 | End: 2018-04-26

## 2018-03-05 NOTE — TELEPHONE ENCOUNTER
"Requested Prescriptions   Pending Prescriptions Disp Refills     atorvastatin (LIPITOR) 20 MG tablet [Pharmacy Med Name: ATORVASTATIN 20MG TABLET]  Last Written Prescription Date:  03/08/2017  Last Fill Quantity: 90,  # refills: 3   Last office visit: 3/22/2017 with prescribing provider:  Mg   Future Office Visit:     90 tablet      Sig: TAKE 1 TABLET BY MOUTH DAILY AT BEDTIME    Statins Protocol Passed    3/5/2018  1:02 AM       Passed - LDL on file in past 12 months    Recent Labs   Lab Test  03/23/17   1028   LDL  114*            Passed - No abnormal creatine kinase in past 12 months    No lab results found.         Passed - Recent (12 mo) or future (30 days) visit within the authorizing provider's specialty    Patient had office visit in the last year or has a visit in the next 30 days with authorizing provider.  See \"Patient Info\" tab in inbasket, or \"Choose Columns\" in Meds & Orders section of the refill encounter.            Passed - Patient is age 18 or older        metoprolol succinate (TOPROL-XL) 25 MG 24 hr tablet [Pharmacy Med Name: METOPROLOL SUCC 25MG ER TAB]  Last Written Prescription Date:  06/06/2017  Last Fill Quantity: 90,  # refills: 3   Last office visit: 3/22/2017 with prescribing provider:  Mg   Future Office Visit:     180 tablet      Sig: TAKE 1 TABLET BY MOUTH TWICE DAILY    Beta-Blockers Protocol Passed    3/5/2018  1:02 AM       Passed - Blood pressure under 140/90 in past 12 months    BP Readings from Last 3 Encounters:   06/06/17 118/72   03/22/17 105/67   03/07/17 135/75                Passed - Patient is age 6 or older       Passed - Recent (12 mo) or future (30 days) visit within the authorizing provider's specialty    Patient had office visit in the last year or has a visit in the next 30 days with authorizing provider.  See \"Patient Info\" tab in inbasket, or \"Choose Columns\" in Meds & Orders section of the refill encounter.             Hemanth Navarro RT (R)    "

## 2018-03-13 DIAGNOSIS — N40.1 BENIGN NON-NODULAR PROSTATIC HYPERPLASIA WITH LOWER URINARY TRACT SYMPTOMS: ICD-10-CM

## 2018-03-13 RX ORDER — TERAZOSIN 2 MG/1
2 CAPSULE ORAL 2 TIMES DAILY
Qty: 180 CAPSULE | Refills: 3 | Status: SHIPPED | OUTPATIENT
Start: 2018-03-13 | End: 2019-05-22

## 2018-03-13 NOTE — TELEPHONE ENCOUNTER
Medication refilled per [facility] RN protocol.    BP Readings from Last 3 Encounters:   06/06/17 118/72   03/22/17 105/67   03/07/17 135/75       Mariel DEJESUS RN BSN PHN  Specialty Clinics

## 2018-03-24 DIAGNOSIS — I49.3 PVC'S (PREMATURE VENTRICULAR CONTRACTIONS): ICD-10-CM

## 2018-03-26 NOTE — TELEPHONE ENCOUNTER
"Requested Prescriptions   Pending Prescriptions Disp Refills     metoprolol succinate (TOPROL-XL) 25 MG 24 hr tablet [Pharmacy Med Name: METOPROLOL SUCC 25MG ER TAB]  Last Written Prescription Date:  03/05/2018  Last Fill Quantity: 60,  # refills: 0   Last office visit: 3/22/2017 with prescribing provider:  Mg   Future Office Visit:     60 tablet      Sig: TAKE 1 TABLET BY MOUTH TWICE DAILY -- NEED TO BE SEEN BY PHYSICIAN FOR FUTURE REFILLS    Beta-Blockers Protocol Failed    3/24/2018 11:23 AM       Failed - Recent (12 mo) or future (30 days) visit within the authorizing provider's specialty    Patient had office visit in the last 12 months or has a visit in the next 30 days with authorizing provider or within the authorizing provider's specialty.  See \"Patient Info\" tab in inbasket, or \"Choose Columns\" in Meds & Orders section of the refill encounter.           Passed - Blood pressure under 140/90 in past 12 months    BP Readings from Last 3 Encounters:   06/06/17 118/72   03/22/17 105/67   03/07/17 135/75                Passed - Patient is age 6 or older        Hemanth TURNER (R)    "

## 2018-03-29 RX ORDER — METOPROLOL SUCCINATE 25 MG/1
TABLET, EXTENDED RELEASE ORAL
Qty: 60 TABLET | Refills: 0 | Status: SHIPPED | OUTPATIENT
Start: 2018-03-29 | End: 2018-04-26

## 2018-03-29 NOTE — TELEPHONE ENCOUNTER
Routing refill request for metoprolol succinate to provider for review/approval because:  Magda given x1 and patient did not follow up, please advise  Patient needs to be seen because it has been more than 1 year since last office visit (last OV with PCP 3/22/17).    Farnaz SON RN

## 2018-04-20 ENCOUNTER — HOSPITAL ENCOUNTER (EMERGENCY)
Facility: CLINIC | Age: 58
Discharge: HOME | End: 2018-04-20
Attending: EMERGENCY MEDICINE | Admitting: EMERGENCY MEDICINE
Payer: COMMERCIAL

## 2018-04-20 VITALS
WEIGHT: 173 LBS | TEMPERATURE: 98.7 F | OXYGEN SATURATION: 97 % | HEART RATE: 45 BPM | SYSTOLIC BLOOD PRESSURE: 151 MMHG | RESPIRATION RATE: 16 BRPM | BODY MASS INDEX: 23.63 KG/M2 | DIASTOLIC BLOOD PRESSURE: 69 MMHG

## 2018-04-20 DIAGNOSIS — M54.42 CHRONIC LOW BACK PAIN WITH LEFT-SIDED SCIATICA, UNSPECIFIED BACK PAIN LATERALITY: ICD-10-CM

## 2018-04-20 DIAGNOSIS — G89.29 CHRONIC LOW BACK PAIN WITH LEFT-SIDED SCIATICA, UNSPECIFIED BACK PAIN LATERALITY: ICD-10-CM

## 2018-04-20 DIAGNOSIS — R20.2 PARESTHESIAS: ICD-10-CM

## 2018-04-20 PROCEDURE — G0463 HOSPITAL OUTPT CLINIC VISIT: HCPCS | Performed by: EMERGENCY MEDICINE

## 2018-04-20 PROCEDURE — G0463 HOSPITAL OUTPT CLINIC VISIT: HCPCS

## 2018-04-20 PROCEDURE — 99214 OFFICE O/P EST MOD 30 MIN: CPT | Mod: Z6 | Performed by: EMERGENCY MEDICINE

## 2018-04-20 NOTE — ED AVS SNAPSHOT
[facility] Emergency Department    [address] [address]    Phone:  [phone]    Fax:  [phone]                                       Justin Rivas   MRN: [id number]    Department:  [facility] Emergency Department   Date of Visit:  4/20/2018           After Visit Summary Signature Page     I have received my discharge instructions, and my questions have been answered. I have discussed any challenges I see with this plan with the nurse or doctor.    ..........................................................................................................................................  Patient/Patient Representative Signature      ..........................................................................................................................................  Patient Representative Print Name and Relationship to Patient    ..................................................               ................................................  Date                                            Time    ..........................................................................................................................................  Reviewed by Signature/Title    ...................................................              ..............................................  Date                                                            Time

## 2018-04-20 NOTE — ED AVS SNAPSHOT
[facility] Emergency Department    [address] [address]    Phone:  [phone]    Fax:  [phone]                                       Justin Rivas   MRN: [id number]    Department:  [facility] Emergency Department   Date of Visit:  4/20/2018           Patient Information     Date Of Birth          1960        Your diagnoses for this visit were:     Paresthesias-left lower extremity/suspect S1 dermatome     Chronic low back pain with left-sided sciatica, unspecified back pain laterality        You were seen by Ramesh Stanley DO.      Follow-up Information     Follow up with Leatha Holliday DO.    Specialty:  Internal Medicine    Contact information:    [address] [id number] [phone]          Discharge Instructions       Until completion of the lumbar MRI recommend restricting lifting to no more than 40 pounds.  Try to avoid picking things up off the floor reaching for things overhead.  MRI of the lumbar spine has been ordered.  Please call Monday morning for scheduling.  This typically can be completed less than one week's time.  Follow-up with your primary clinic provider to discuss MRI results.      24 Hour Appointment Hotline       To make an appointment at any [facility] clinic, call [phone] ([phone]). If you don't have a family doctor or clinic, we will help you find one. [facility] clinics are conveniently located to serve the needs of you and your family.          ED Discharge Orders     MRI Lumbar spine w/o contrast                    Review of your medicines      Our records show that you are taking the medicines listed below. If these are incorrect, please call your family doctor or clinic.        Dose / Directions Last dose taken    acetaminophen 325 MG tablet   Commonly known as:  TYLENOL   Dose:  2 tablet        Take 2 tablets by mouth every 6 hours as needed.   Refills:  0        aspirin EC 81 MG EC tablet   Dose:   81 mg        Take 1 tablet (81 mg) by mouth daily   Refills:  0        atorvastatin 20 MG tablet   Commonly known as:  LIPITOR   Dose:  20 mg   Quantity:  30 tablet        Take 1 tablet (20 mg) by mouth At Bedtime (Needs follow-up appointment for this medication)   Refills:  0        calcium + D 600-200 MG-UNIT Tabs   Dose:  1 tablet   Quantity:  100 tablet   Generic drug:  calcium carbonate-vitamin D        Take 1 tablet by mouth daily.   Refills:  12        fish oil-omega-3 fatty acids 1000 MG capsule        1 CAPSULE ORALLY DAILY   Refills:  0        ibuprofen 200 MG tablet   Commonly known as:  ADVIL/MOTRIN   Dose:  400 mg        Take 400 mg by mouth every 4 hours as needed.   Refills:  0        * metoprolol succinate 25 MG 24 hr tablet   Commonly known as:  TOPROL-XL   Dose:  25 mg   Quantity:  90 tablet        Take 1 tablet (25 mg) by mouth daily   Refills:  3        * metoprolol succinate 25 MG 24 hr tablet   Commonly known as:  TOPROL-XL   Quantity:  60 tablet        TAKE 1 TABLET BY MOUTH TWICE DAILY -- NEED TO BE SEEN BY PHYSICIAN FOR FUTURE REFILLS   Refills:  0        MULTI vitamin  MENS Tabs        one daily   Refills:  0        order for DME   Quantity:  1 Device        Trilok Ankle Brace   Refills:  0        [employer] PO        1 capsule daily   Refills:  0        RESTASIS 0.05 % ophthalmic emulsion   Quantity:  180 each   Generic drug:  cycloSPORINE        INSTILL 1 DROP IN BOTH EYES TWICE A DAY   Refills:  3        SB SALINE NOSE NA        Spray  in nostril. BID two sprays, per Dr Arce.   Refills:  0        terazosin 2 MG capsule   Commonly known as:  HYTRIN   Dose:  2 mg   Quantity:  180 capsule        Take 1 capsule (2 mg) by mouth 2 times daily   Refills:  3        * Notice:  This list has 2 medication(s) that are the same as other medications prescribed for you. Read the directions carefully, and ask your doctor or other care provider to review them with you.            Orders  Needing Specimen Collection     None      Pending Results     No orders found from 4/18/2018 to 4/21/2018.            Pending Culture Results     No orders found from 4/18/2018 to 4/21/2018.            Pending Results Instructions     If you had any lab results that were not finalized at the time of your Discharge, you can call the ED Lab Result RN at [phone]. You will be contacted by this team for any positive Lab results or changes in treatment. The nurses are available 7 days a week from 10A to 6:30P.  You can leave a message 24 hours per day and they will return your call.        Test Results From Your Hospital Stay               Thank you for choosing [facility]       Thank you for choosing [facility] for your care. Our goal is always to provide you with excellent care. Hearing back from our patients is one way we can continue to improve our services. Please take a few minutes to complete the written survey that you may receive in the mail after you visit with us. Thank you!        [employer] Information     [email or web address] gives you secure access to your electronic health record. If you see a primary care provider, you can also send messages to your care team and make appointments. If you have questions, please call your primary care clinic.  If you do not have a primary care provider, please call [phone] and they will assist you.        Care EveryWhere ID     This is your Care EveryWhere ID. This could be used by other organizations to access your [facility] medical records  [id number]        Equal Access to Services     VINEET SCHMITZ AH: Hadii yoselin Calderon, ericka carranza, qawesly tony adephil fritz. So [facility] [phone].    ATENCIÓN: Si habla español, tiene a clement disposición servicios gratuitos de asistencia lingüística. Llame al [phone].    We comply with applicable federal civil rights laws and [place] laws. We do not discriminate on the basis of race,  color, national origin, age, disability, sex, sexual orientation, or gender identity.            After Visit Summary       This is your record. Keep this with you and show to your community pharmacist(s) and doctor(s) at your next visit.

## 2018-04-21 NOTE — DISCHARGE INSTRUCTIONS
Until completion of the lumbar MRI recommend restricting lifting to no more than 40 pounds.  Try to avoid picking things up off the floor reaching for things overhead.  MRI of the lumbar spine has been ordered.  Please call Monday morning for scheduling.  This typically can be completed less than one week's time.  Follow-up with your primary clinic provider to discuss MRI results.

## 2018-04-21 NOTE — ED NOTES
"Pt  Reports brief episodes of a feeling of \"hotness\" in legs between calf and lower thigh for the past 3 days. Denies any weakness. Does admit to some low back pain. Denies loss of bowel or bladder continence. MD at bedside for assessment.  "

## 2018-04-21 NOTE — ED PROVIDER NOTES
"  History     Chief Complaint   Patient presents with     Burning in leg     Burning sensation down back of both legs, L>R     HPI  Justin Rivas is a 58 year old male significant past medical history for coronary artery disease, hyperlipidemia, surgical correction for superficial varicose veins bilateral lower extremities.  Patient presents with complaint of intermittent burning sensation in the posterior thigh on the left-hand side.  Does not extend below the popliteal fossa.  This been happening 3-4 times daily over the last 3 days.  Has noted no rash concerning for zoster.  No injury.  Patient does report he has had concurrent complaint of pain in his low back.  States he has had acute on chronic low back problems since his late teens.  Typically resolves after 3-5 days.  Certain activities such as swinging a golf club is problematic.  No change in bladder or bowel function.  Has had no weakness but recent weeks has been having occasional muscle fasciculation on the left.    Problem List:    Patient Active Problem List    Diagnosis Date Noted     Coronary artery disease involving native coronary artery of native heart without angina pectoris 03/22/2017     Priority: Medium     PVC's (premature ventricular contractions) 03/16/2016     Priority: Medium     On beta blocker, follows with cardiology.       Mixed hyperlipidemia 05/15/2013     Priority: Medium     On statin       Lentigo 03/20/2012     Priority: Medium     Angioma 03/20/2012     Priority: Medium     Seborrheic keratosis 03/20/2012     Priority: Medium     (Problem list name updated by automated process. Provider to review and confirm.)       Left elbow pain 02/24/2012     Priority: Medium     Advanced directives, counseling/discussion 02/23/2011     Priority: Medium     Patient does not have an Advance/Health Care Directive (HCD), given \"What is Advance Care Planning?\" flyer.    Charlee Rogers  February 23, 2011         Varicose veins      Priority: " Medium     Ulcerative proctitis (H)      Priority: Medium     Family history of premature coronary heart disease      Priority: Medium     Palpitations      Priority: Medium     Benign non-nodular prostatic hyperplasia with lower urinary tract symptoms 10/31/2008     Priority: Medium        Past Medical History:    Past Medical History:   Diagnosis Date     BPH (benign prostatic hyperplasia)      Coronary artery disease      Family history of premature coronary heart disease      Hyperlipidemia      Hyperlipidemia LDL goal <160 5/15/2013     Other premature beats      Palpitations      Ulcerative proctitis (H)      Varicose veins        Past Surgical History:    Past Surgical History:   Procedure Laterality Date     ABLATE VEIN VARICOSE RADIO FREQUENCY WITHOUT PHLEBECTOMY MULTIPLE STAB  1/24/2013    Procedure: ABLATE VEIN VARICOSE RADIO FREQUENCY WITHOUT PHLEBECTOMY MULTIPLE STAB;  Left leg ablation -vein mapping at 0830;  Surgeon: Amadeo Porter MD;  Location: [facility]     COLONOSCOPY  3/30/2011    COLONOSCOPY performed by ESHA KNOWLES at [facility]       Family History:    Family History   Problem Relation Age of Onset     Hypertension Mother      C.A.D. Father      Circulatory Father      blood disorder     CEREBROVASCULAR DISEASE Maternal Grandmother      CEREBROVASCULAR DISEASE Maternal Grandfather      Alzheimer Disease Paternal Grandmother      C.A.D. Paternal Grandmother      C.A.D. Paternal Grandfather      CANCER Brother      lung cancer at age 50     Hypertension Sister      HEART DISEASE Sister        Social History:  Marital Status:  Single [1]  Social History   Substance Use Topics     Smoking status: Never Smoker     Smokeless tobacco: Never Used     Alcohol use Yes      Comment: occasional        Medications:      acetaminophen (TYLENOL) 325 MG tablet   aspirin EC 81 MG tablet   atorvastatin (LIPITOR) 20 MG tablet   Calcium Carbonate-Vitamin D (CALCIUM + D) 600-200 MG-UNIT per tablet   fish  oil-omega-3 fatty acids (FISH OIL) 1000 MG capsule   ibuprofen (ADVIL,MOTRIN) 200 MG tablet   Magic Mouthwash (FV std formula) lidocaine visc 2% 2.5mL/5mL & maalox/mylanta w/ simeth 2.5mL/5mL & diphenhydrAMINE 5mg/5mL   metoprolol (TOPROL-XL) 25 MG 24 hr tablet   metoprolol succinate (TOPROL-XL) 25 MG 24 hr tablet   MULTI VITAMIN MENS OR TABS   order for DME   Probiotic Product ([place] PO)   RESTASIS 0.05 % ophthalmic emulsion   SB SALINE NOSE NA   terazosin (HYTRIN) 2 MG capsule         Review of Systems  All pertinent positives and negatives are documented in the HPI.  All others reviewed and are negative .    Physical Exam   BP: 184/48  Pulse: (!) 45  Temp: 98.7  F (37.1  C)  Resp: 16  Weight: 78.5 kg (173 lb)  SpO2: 97 %      Physical Exam  Vital signs reviewed  Patient is tall and lean.  Walks without antalgic gait.  Reproducible mechanical low back pain with flexion and extension.  Treat leg raise on the left triggered no true radicular type pain but did have a recurrence of that burning sensation in the posterior thigh extending to the popliteal fossa.  Lower extremities show normal muscle tone bulk and strength.  Patient still has some superficial varicose veins with some venous lakes in the distribution of the greater saphenous vein.  Patient had no concerns for DVT.  There is no asymmetric swelling.  There is no calf tenderness.  Negative Homans test bilateral.        ED Course     ED Course     Procedures              Assessments & Plan (with Medical Decision Making)  Acute on chronic lumbar back pain with development of left lower extremity paresthesias.  Scribed is a burning sensation that follows the S1 dermatome.  This could be consistent with a chronic disc problem at the L5-S1 level.  He indicates he initially injured his back in his late teens and is been dealing with acute on chronic pain but typically it resolves after rest and stretching.  Activities such as heavy lifting or  swinging a golf club is always provoked recurrence of pain.  Given that this is acute on chronic advised the patient proceed with lumbar MRI imaging.  Patient is to follow primary care provider after MR imaging for results.  Other things to consider differential diagnosis would be shingles with the paresthesias preceding the onset of the rash.  This is day 3 so we would expect if it is shingles rash would form in the next 1-2 days at the latest.  Unlikely other processes such as MS.  Nothing to support nutritional deficiencies due to folate or B12.  This was not screened for.     I have reviewed the nursing notes.    I have reviewed the findings, diagnosis, plan and need for follow up with the patient.      New Prescriptions    No medications on file       Final diagnoses:   Paresthesias-left lower extremity/suspect S1 dermatome   Chronic low back pain with left-sided sciatica, unspecified back pain laterality       4/20/2018   [facility] EMERGENCY DEPARTMENT     Ramesh Stanley,   04/20/18 1950

## 2018-04-24 ENCOUNTER — HOSPITAL ENCOUNTER (OUTPATIENT)
Dept: MRI IMAGING | Facility: CLINIC | Age: 58
Discharge: HOME | End: 2018-04-24
Attending: EMERGENCY MEDICINE | Admitting: EMERGENCY MEDICINE
Payer: COMMERCIAL

## 2018-04-24 DIAGNOSIS — M54.42 CHRONIC LOW BACK PAIN WITH LEFT-SIDED SCIATICA, UNSPECIFIED BACK PAIN LATERALITY: ICD-10-CM

## 2018-04-24 DIAGNOSIS — G89.29 CHRONIC LOW BACK PAIN WITH LEFT-SIDED SCIATICA, UNSPECIFIED BACK PAIN LATERALITY: ICD-10-CM

## 2018-04-24 DIAGNOSIS — R20.2 PARESTHESIAS: ICD-10-CM

## 2018-04-24 PROCEDURE — 72148 MRI LUMBAR SPINE W/O DYE: CPT

## 2018-04-26 ENCOUNTER — OFFICE VISIT (OUTPATIENT)
Dept: FAMILY MEDICINE | Facility: CLINIC | Age: 58
End: 2018-04-26
Payer: COMMERCIAL

## 2018-04-26 VITALS
HEIGHT: 71 IN | WEIGHT: 178 LBS | BODY MASS INDEX: 24.92 KG/M2 | HEART RATE: 76 BPM | OXYGEN SATURATION: 98 % | DIASTOLIC BLOOD PRESSURE: 67 MMHG | SYSTOLIC BLOOD PRESSURE: 134 MMHG | TEMPERATURE: 98.1 F

## 2018-04-26 DIAGNOSIS — I25.10 CORONARY ARTERY DISEASE INVOLVING NATIVE CORONARY ARTERY OF NATIVE HEART WITHOUT ANGINA PECTORIS: ICD-10-CM

## 2018-04-26 DIAGNOSIS — I49.3 PVC'S (PREMATURE VENTRICULAR CONTRACTIONS): ICD-10-CM

## 2018-04-26 DIAGNOSIS — Z12.11 SCREEN FOR COLON CANCER: ICD-10-CM

## 2018-04-26 DIAGNOSIS — M54.50 CHRONIC BILATERAL LOW BACK PAIN WITHOUT SCIATICA: Primary | ICD-10-CM

## 2018-04-26 DIAGNOSIS — E78.2 MIXED HYPERLIPIDEMIA: ICD-10-CM

## 2018-04-26 DIAGNOSIS — Z12.5 SCREENING FOR PROSTATE CANCER: ICD-10-CM

## 2018-04-26 DIAGNOSIS — G89.29 CHRONIC BILATERAL LOW BACK PAIN WITHOUT SCIATICA: Primary | ICD-10-CM

## 2018-04-26 PROCEDURE — 99214 OFFICE O/P EST MOD 30 MIN: CPT | Performed by: INTERNAL MEDICINE

## 2018-04-26 RX ORDER — ATORVASTATIN CALCIUM 20 MG/1
20 TABLET, FILM COATED ORAL AT BEDTIME
Qty: 90 TABLET | Refills: 3 | Status: SHIPPED | OUTPATIENT
Start: 2018-04-26 | End: 2019-05-22

## 2018-04-26 RX ORDER — METOPROLOL SUCCINATE 25 MG/1
25 TABLET, EXTENDED RELEASE ORAL DAILY
Qty: 90 TABLET | Refills: 3 | Status: SHIPPED | OUTPATIENT
Start: 2018-04-26 | End: 2019-05-22

## 2018-04-26 RX ORDER — TIZANIDINE 2 MG/1
2-4 TABLET ORAL 3 TIMES DAILY
Qty: 30 TABLET | Refills: 1 | Status: SHIPPED | OUTPATIENT
Start: 2018-04-26 | End: 2019-05-22

## 2018-04-26 NOTE — PATIENT INSTRUCTIONS
1. Referral to physical therapy  2. Continue with ibuprofen.  3. Try topicals such as Aspercream with Lidocaine, Capsaicin, Icy Hot, Biofreeze, Salon Pas  4. Heat or ice  5. You are due for fasting cholesterol and colonoscopy    Acute Low Back Pain: Self Care at Home Instructions  Conservative Treatment    Remaining active supports a quicker recovery, keep moving. (ex. Walking, increase time & speed as tolerated).    Bed rest is not recommended. Minimize sitting. Do not remain in one position for extended periods of time.    Maintain routine activity with attention to correct posture; stop aggravating activities.    Over-the-counter pain medications for short term symptom control. (See below)    Muscle relaxants are sometimes helpful for few days, but may cause drowsiness. (See below)    Cold packs or heat based upon your preference.    Most people improve within 2 weeks; most have significant improvement within 4 weeks.    If symptoms worsen or you experience numbness, contact your provider immediately.    Medications for Pain Relief  Recommended over-the-counter non-steroidal anti-inflammatories:     Ibuprofen (Advil, Motrin) 600 mg. three times a day as needed for pain      OR   Naproxen Sodium (Aleve) 220-440 mg. two times per day as needed for pain    Low Back Pain Exercises   Exercises that stretch and strengthen the muscles of your abdomen and spine can help prevent back problems. If your back and abdominal muscles are strong, you can maintain good posture and keep your spine in its correct position.     If your muscles are tight, take a warm shower or bath before doing the exercises. Exercise on a rug or mat. Stop doing any exercise that causes pain until you have talked with your provider.     These exercises are intended only as suggestions. Ask your provider or physical therapist to help you develop an exercise program. Check with your provider before starting the exercises. Ask your provider how many  times a week you need to do the exercises.     Low Back Pain Exercises                             Cat and camel: Get down on your hands and knees. Let your stomach sag, allowing your back to curve downward. Hold this position for 5 seconds. Then arch your back and hold for 5 seconds. Do 3 sets of 10.       Pelvic tilt: Lie on your back with your knees bent and your feet flat on the floor. Tighten your abdominal muscles and push your lower back into the floor. Hold this position for 5 seconds, then relax. Do 3 sets of 10.       Extension exercise: Lie face down on the floor for 5 minutes. If this hurts too much, lie face down with a pillow under your stomach. This should relieve your leg or back pain. When you can lie on your stomach for 5 minutes without a pillow, then you can continue with the rest of this exercise.     After lying on your stomach for 5 minutes, prop yourself up on your elbows for another 5 minutes. Lie flat again for 1 minute, then press down on your hands and extend your elbows while keeping your hips flat on the floor. Hold for 1 second and lower yourself to the floor. Repeat 10 times. Do 4 sets. Rest for 2 minutes between sets. You should have no pain in your legs when you do this, but it is normal to feel pain in your lower back. Do this several times a day.     Developed by [employer]   Published by [employer].   Last modified: 2009-02-08   Last reviewed: 2008-07-07   This content is reviewed periodically and is subject to change as new health information becomes available. The information is intended to inform and educate and is not a replacement for medical evaluation, advice, diagnosis or treatment by a healthcare professional.   Adult Health Advisor 2009.1 Index  Adult Health Advisor 2009.1 Credits     2009 [facility] and/or its affiliates. All Rights Reserved.

## 2018-04-26 NOTE — PROGRESS NOTES
SUBJECTIVE:   Justin Rivas is a 58 year old male who presents to clinic today for the following health issues:      ED/UC Followup:    Facility:  [facility]  Date of visit: 2018  Reason for visit: Paresthesias-left lower extremity/suspect S1 dermatome, Chronic low back pain with left-sided sciatic, unspecified back pain laterality  Current Status: Still sore     Wants to talk about MRI done     Cardiologist want lower metoprolol: pt wants to discuss     Back Pain       Duration: Follow up E.D.        Specific cause: fall 3 to 4 days before E.D     Description:   Location of pain: low back both  Character of pain: dull ache and constant  Pain radiation:none  New numbness or weakness in legs, not attributed to pain:  no     Intensity: Currently 7/10, At its worst 8/10, moderate    History:   Pain interferes with job: No  History of back problems: no prior back problems  Any previous MRI or X-rays: None  Sees a specialist for back pain:  No  Therapies tried without relief: na    Alleviating factors:   Improved by: na      Precipitating factors:  Worsened by: Lifting, Bending, Standing, Sitting, Lying Flat and Walking    Functional and Psychosocial Screen (Camille GeoGRAFI Back):      Not performed today    Accompanying Signs & Symptoms:  Risk of Fracture:  None  Risk of Cauda Equina:  None  Risk of Infection:  None  Risk of Cancer:  None  Risk of Ankylosing Spondylitis:  Onset at age <35, male, AND morning back stiffness. no     --reports intermittent right sided > Left low back pain since age 18 after lifting heavy .  --1 week ago developed burning feeling in back of left thigh.  Lasted a few seconds.  No numbness or pain in legs.  Was initially worried about a blood clot.  No rash has appeared, was worried about zoster  --paternal uncle  of MS  --his bilateral low back pain flared up along with this new burning sensation in the thigh.  --pain is worse with bending over, lifting heavy  objects, twisting.  Worst first thing in AM  --improved with stretching, sitting  --has used ibuprofen 200 mg 1-3 x day as needed which helps.    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  Worse after golfing but this is baseline.          Current Outpatient Prescriptions   Medication Sig Dispense Refill     aspirin EC 81 MG tablet Take 1 tablet (81 mg) by mouth daily       atorvastatin (LIPITOR) 20 MG tablet Take 1 tablet (20 mg) by mouth At Bedtime (Needs follow-up appointment for this medication) 30 tablet 0     Calcium Carbonate-Vitamin D (CALCIUM + D) 600-200 MG-UNIT per tablet Take 1 tablet by mouth daily. 100 tablet 12     fish oil-omega-3 fatty acids (FISH OIL) 1000 MG capsule 1 CAPSULE ORALLY DAILY       ibuprofen (ADVIL,MOTRIN) 200 MG tablet Take 400 mg by mouth every 4 hours as needed.       metoprolol (TOPROL-XL) 25 MG 24 hr tablet Take 1 tablet (25 mg) by mouth daily 90 tablet 3     metoprolol succinate (TOPROL-XL) 25 MG 24 hr tablet TAKE 1 TABLET BY MOUTH TWICE DAILY -- NEED TO BE SEEN BY PHYSICIAN FOR FUTURE REFILLS 60 tablet 0     MULTI VITAMIN MENS OR TABS one daily  0     Probiotic Product ([place] PO) 1 capsule daily       RESTASIS 0.05 % ophthalmic emulsion INSTILL 1 DROP IN BOTH EYES TWICE A  each 3     terazosin (HYTRIN) 2 MG capsule Take 1 capsule (2 mg) by mouth 2 times daily 180 capsule 3     acetaminophen (TYLENOL) 325 MG tablet Take 2 tablets by mouth every 6 hours as needed.       SB SALINE NOSE NA Spray 2 sprays into both nostrils 2 times daily BID two sprays, per Dr Arce.          Reviewed and updated as needed this visit by clinical staff  Tobacco  Allergies  Meds  Problems  Med Hx  Surg Hx  Fam Hx  Soc Hx        Reviewed and updated as needed this visit by Provider  Allergies  Meds  Problems  Fam Hx         ROS:  Constitutional, HEENT, cardiovascular, pulmonary, gi and gu systems are negative, except as  "otherwise noted.    OBJECTIVE:     /67 (BP Location: Right arm, Patient Position: Chair, Cuff Size: Adult Large)  Pulse 76  Temp 98.1  F (36.7  C) (Tympanic)  Ht 5' 11.26\" (1.81 m)  Wt 178 lb (80.7 kg)  SpO2 98%  BMI 24.65 kg/m2  Body mass index is 24.65 kg/(m^2).  GENERAL APPEARANCE: healthy, alert and no distress  Comprehensive back pain exam:  Tenderness of right paralumbar spinal muscles, Pain limits the following motions: flex/ext, right side bending, Lower extremity strength functional and equal on both sides, Lower extremity reflexes within normal limits bilaterally, Lower extremity sensation normal and equal on both sides and Straight leg raise negative bilaterally    Diagnostic Test Results:  Results for orders placed or performed during the hospital encounter of 04/24/18   MRI Lumbar spine w/o contrast    Narrative    MRI LUMBAR SPINE WITHOUT CONTRAST   4/24/2018 11:17 AM     HISTORY: Low back pain with left and occasional right lower extremity  radiculopathy.    COMPARISON: None.    TECHNIQUE: Multiplanar MR imaging was performed without contrast.    FINDINGS: Numbering of the levels is based on what appear to be five  lumbar type vertebral bodies. Vertebral body alignment is normal. No  fracture is seen. No pars interarticularis defect is demonstrated. No  osseous lesion is seen. No abnormal marrow signal intensity is  identified. The conus medullaris terminates at the level of the L1  vertebral body. No intrathecal abnormality is seen. The adjacent soft  tissues are unremarkable.    Findings by specific level:    T12-L1: The disc and facet joints are normal. No stenosis is seen.    L1-L2: The disc and facet joints are normal. No stenosis is seen.    L2-L3: The disc height is well preserved. There is a mild disc bulge  with no focal herniation seen. The facet joints are unremarkable. No  central canal stenosis is present. There is mild narrowing of the  neural foramina bilaterally.    L3-L4: " The disc and facet joints are normal. No stenosis is seen.    L4-L5: The disc height is well preserved. There is a mild disc bulge.  There is a small fissure of the posterior annular fibers centrally  with no focal disc herniation seen. There are mild degenerative  changes in the facet joints. No central canal stenosis is present.  There is mild narrowing of the neural foramina bilaterally.    L5-S1: The disc and facet joints are normal. No stenosis is seen.      Impression    IMPRESSION: Mild degenerative changes at L2-L3 and L4-L5. This results  in mild bilateral neural foraminal stenosis at these two levels. No  disc herniation or other site of stenosis is seen.    NONA PARRY MD       ASSESSMENT/PLAN:     1. Chronic bilateral low back pain without sciatica - mild foraminal stenosis with predominantly muscular pain.  - tiZANidine (ZANAFLEX) 2 MG tablet; Take 1-2 tablets (2-4 mg) by mouth 3 times daily  Dispense: 30 tablet; Refill: 1  - PHYSICAL THERAPY REFERRAL    2. Screen for colon cancer    3. Mixed hyperlipidemia -  - stable, refill provided  - atorvastatin (LIPITOR) 20 MG tablet; Take 1 tablet (20 mg) by mouth At Bedtime  Dispense: 90 tablet; Refill: 3  - Lipid panel reflex to direct LDL Fasting; Future    4. PVC's (premature ventricular contractions)  - metoprolol succinate (TOPROL-XL) 25 MG 24 hr tablet; Take 1 tablet (25 mg) by mouth daily And twice daily as needed  Dispense: 90 tablet; Refill: 3    5. Screening for prostate cancer  - PSA, screen; Future    6. Coronary artery disease involving native coronary artery of native heart without angina pectoris  - Comprehensive metabolic panel; Future    1. Referral to physical therapy  2. Continue with ibuprofen.  3. Try topicals such as Aspercream with Lidocaine, Capsaicin, Icy Hot, Biofreeze, Salon Pas  4. Heat or ice  5. You are due for fasting cholesterol and colonoscopy      Leatha Holliday, DO  [facility]

## 2018-04-26 NOTE — NURSING NOTE
"Chief Complaint   Patient presents with     Er F/u     [facility] 4/20/2018 - Paresthesias-left lower extremity/suspect S1 dermatome, Chronic low back pain with left-sided sciatic, unspecified back pain laterality     Back Pain     Wants to discuss an MRI done       Initial /67 (BP Location: Right arm, Patient Position: Chair, Cuff Size: Adult Large)  Pulse 76  Temp 98.1  F (36.7  C) (Tympanic)  Ht 5' 11.26\" (1.81 m)  Wt 178 lb (80.7 kg)  SpO2 98%  BMI 24.65 kg/m2 Estimated body mass index is 24.65 kg/(m^2) as calculated from the following:    Height as of this encounter: 5' 11.26\" (1.81 m).    Weight as of this encounter: 178 lb (80.7 kg).  Medication Reconciliation: complete   Gracie Valadez CMA (AAMA)   (aka: Ludmila Valadez)      "

## 2018-04-26 NOTE — MR AVS SNAPSHOT
After Visit Summary   4/26/2018    Justin Rivas    MRN: [id number]           Patient Information     Date Of Birth          1960        Visit Information        Provider Department      4/26/2018 3:40 PM Leatha Holliday,  [facility]        Today's Diagnoses     Chronic bilateral low back pain without sciatica    -  1    Screen for colon cancer        Mixed hyperlipidemia        PVC's (premature ventricular contractions)          Care Instructions    1. Referral to physical therapy  2. Continue with ibuprofen.  3. Try topicals such as Aspercream with Lidocaine, Capsaicin, Icy Hot, Biofreeze, Salon Pas  4. Heat or ice  5. You are due for fasting cholesterol and colonoscopy    Acute Low Back Pain: Self Care at Home Instructions  Conservative Treatment    Remaining active supports a quicker recovery, keep moving. (ex. Walking, increase time & speed as tolerated).    Bed rest is not recommended. Minimize sitting. Do not remain in one position for extended periods of time.    Maintain routine activity with attention to correct posture; stop aggravating activities.    Over-the-counter pain medications for short term symptom control. (See below)    Muscle relaxants are sometimes helpful for few days, but may cause drowsiness. (See below)    Cold packs or heat based upon your preference.    Most people improve within 2 weeks; most have significant improvement within 4 weeks.    If symptoms worsen or you experience numbness, contact your provider immediately.    Medications for Pain Relief  Recommended over-the-counter non-steroidal anti-inflammatories:     Ibuprofen (Advil, Motrin) 600 mg. three times a day as needed for pain      OR   Naproxen Sodium (Aleve) 220-440 mg. two times per day as needed for pain    Low Back Pain Exercises   Exercises that stretch and strengthen the muscles of your abdomen and spine can help prevent back problems. If your back and abdominal muscles are  strong, you can maintain good posture and keep your spine in its correct position.     If your muscles are tight, take a warm shower or bath before doing the exercises. Exercise on a rug or mat. Stop doing any exercise that causes pain until you have talked with your provider.     These exercises are intended only as suggestions. Ask your provider or physical therapist to help you develop an exercise program. Check with your provider before starting the exercises. Ask your provider how many times a week you need to do the exercises.     Low Back Pain Exercises                             Cat and camel: Get down on your hands and knees. Let your stomach sag, allowing your back to curve downward. Hold this position for 5 seconds. Then arch your back and hold for 5 seconds. Do 3 sets of 10.       Pelvic tilt: Lie on your back with your knees bent and your feet flat on the floor. Tighten your abdominal muscles and push your lower back into the floor. Hold this position for 5 seconds, then relax. Do 3 sets of 10.       Extension exercise: Lie face down on the floor for 5 minutes. If this hurts too much, lie face down with a pillow under your stomach. This should relieve your leg or back pain. When you can lie on your stomach for 5 minutes without a pillow, then you can continue with the rest of this exercise.     After lying on your stomach for 5 minutes, prop yourself up on your elbows for another 5 minutes. Lie flat again for 1 minute, then press down on your hands and extend your elbows while keeping your hips flat on the floor. Hold for 1 second and lower yourself to the floor. Repeat 10 times. Do 4 sets. Rest for 2 minutes between sets. You should have no pain in your legs when you do this, but it is normal to feel pain in your lower back. Do this several times a day.     Developed by [employer]   Published by [employer].   Last modified: 2009-02-08   Last reviewed: 2008-07-07   This content is reviewed  periodically and is subject to change as new health information becomes available. The information is intended to inform and educate and is not a replacement for medical evaluation, advice, diagnosis or treatment by a healthcare professional.   Adult Health Advisor 2009.1 Index  Adult Health Advisor 2009.1 Credits     2009 [facility] and/or its affiliates. All Rights Reserved.                     Follow-ups after your visit        Your next 10 appointments already scheduled     Jun 26, 2018  3:00 PM CDT   Return Visit with Jose Rod MD   [facility] ([facility])    [address] [address] [phone]              Future tests that were ordered for you today     Open Future Orders        Priority Expected Expires Ordered    Lipid panel reflex to direct LDL Fasting Routine 4/26/2018 4/26/2019 4/26/2018            Who to contact     If you have questions or need follow up information about today's clinic visit or your schedule please contact [facility] directly at [phone].  Normal or non-critical lab and imaging results will be communicated to you by [employer], letter or phone within 4 business days after the clinic has received the results. If you do not hear from us within 7 days, please contact the clinic through [employer] or phone. If you have a critical or abnormal lab result, we will notify you by phone as soon as possible.  Submit refill requests through [employer] or call your pharmacy and they will forward the refill request to us. Please allow 3 business days for your refill to be completed.          Additional Information About Your Visit        [employer] Information     [employer] gives you secure access to your electronic health record. If you see a primary care provider, you can also send messages to your care team and make appointments. If you have questions, please call your primary care clinic.  If you do not have a  "primary care provider, please call [phone] and they will assist you.        Care EveryWhere ID     This is your Care EveryWhere ID. This could be used by other organizations to access your [place] medical records  [id number]        Your Vitals Were     Pulse Temperature Height Pulse Oximetry BMI (Body Mass Index)       76 98.1  F (36.7  C) (Tympanic) 5' 11.26\" (1.81 m) 98% 24.65 kg/m2        Blood Pressure from Last 3 Encounters:   04/26/18 134/67   04/20/18 151/69   06/06/17 118/72    Weight from Last 3 Encounters:   04/26/18 178 lb (80.7 kg)   04/20/18 173 lb (78.5 kg)   06/06/17 175 lb 6.4 oz (79.6 kg)                 Today's Medication Changes          These changes are accurate as of 4/26/18  4:18 PM.  If you have any questions, ask your nurse or doctor.               Start taking these medicines.        Dose/Directions    tiZANidine 2 MG tablet   Commonly known as:  ZANAFLEX   Used for:  Chronic bilateral low back pain without sciatica   Started by:  Leatha Holliday DO        Dose:  2-4 mg   Take 1-2 tablets (2-4 mg) by mouth 3 times daily   Quantity:  30 tablet   Refills:  1         These medicines have changed or have updated prescriptions.        Dose/Directions    atorvastatin 20 MG tablet   Commonly known as:  LIPITOR   This may have changed:  additional instructions   Used for:  Mixed hyperlipidemia   Changed by:  Leatha Holliday DO        Dose:  20 mg   Take 1 tablet (20 mg) by mouth At Bedtime   Quantity:  90 tablet   Refills:  3       metoprolol succinate 25 MG 24 hr tablet   Commonly known as:  TOPROL-XL   This may have changed:    - additional instructions  - Another medication with the same name was removed. Continue taking this medication, and follow the directions you see here.   Used for:  PVC's (premature ventricular contractions)   Changed by:  Leatha Holliday DO        Dose:  25 mg   Take 1 tablet (25 mg) by mouth daily And twice daily as needed   Quantity:  90 " tablet   Refills:  3            Where to get your medicines      These medications were sent to [id number] - [place], [place] - [address]  [address], [facility] [place] [address]     Phone:  [phone]     atorvastatin 20 MG tablet    metoprolol succinate 25 MG 24 hr tablet    tiZANidine 2 MG tablet                Primary Care Provider Office Phone # Fax #    Leatha Holliday, [phone] [address] [address]        Equal Access to Services     VINEET SCHMITZ : Hadii aad ku hadasho Soomaali, waaxda luqadaha, qaybta kaalmada adeegyada, waxay felicein hayabdifatahn tita black . So [facility] [phone].    ATENCIÓN: Si habla español, tiene a clement disposición servicios gratuitos de asistencia lingüística. Ariana al [phone].    We comply with applicable federal civil rights laws and [place] laws. We do not discriminate on the basis of race, color, national origin, age, disability, sex, sexual orientation, or gender identity.            Thank you!     Thank you for choosing [facility]  for your care. Our goal is always to provide you with excellent care. Hearing back from our patients is one way we can continue to improve our services. Please take a few minutes to complete the written survey that you may receive in the mail after your visit with us. Thank you!             Your Updated Medication List - Protect others around you: Learn how to safely use, store and throw away your medicines at www.disposemymeds.org.          This list is accurate as of 4/26/18  4:18 PM.  Always use your most recent med list.                   Brand Name Dispense Instructions for use Diagnosis    acetaminophen 325 MG tablet    TYLENOL     Take 2 tablets by mouth every 6 hours as needed.        aspirin EC 81 MG EC tablet      Take 1 tablet (81 mg) by mouth daily    CAD (coronary artery disease)       atorvastatin 20 MG tablet    LIPITOR    90 tablet     Take 1 tablet (20 mg) by mouth At Bedtime    Mixed hyperlipidemia       calcium + D 600-200 MG-UNIT Tabs   Generic drug:  calcium carbonate-vitamin D     100 tablet    Take 1 tablet by mouth daily.        fish oil-omega-3 fatty acids 1000 MG capsule      1 CAPSULE ORALLY DAILY        ibuprofen 200 MG tablet    ADVIL/MOTRIN     Take 400 mg by mouth every 4 hours as needed.        metoprolol succinate 25 MG 24 hr tablet    TOPROL-XL    90 tablet    Take 1 tablet (25 mg) by mouth daily And twice daily as needed    PVC's (premature ventricular contractions)       MULTI vitamin  MENS Tabs      one daily        [employer] HE[employer] PO      1 capsule daily        RESTASIS 0.05 % ophthalmic emulsion   Generic drug:  cycloSPORINE     180 each    INSTILL 1 DROP IN BOTH EYES TWICE A DAY    Chronic dryness of both eyes       SB SALINE NOSE NA      Spray 2 sprays into both nostrils 2 times daily BID two sprays, per Dr Arce.    Dysuria       terazosin 2 MG capsule    HYTRIN    180 capsule    Take 1 capsule (2 mg) by mouth 2 times daily    Benign non-nodular prostatic hyperplasia with lower urinary tract symptoms       tiZANidine 2 MG tablet    ZANAFLEX    30 tablet    Take 1-2 tablets (2-4 mg) by mouth 3 times daily    Chronic bilateral low back pain without sciatica

## 2018-06-20 DIAGNOSIS — E78.2 MIXED HYPERLIPIDEMIA: ICD-10-CM

## 2018-06-20 DIAGNOSIS — I25.10 CORONARY ARTERY DISEASE INVOLVING NATIVE CORONARY ARTERY OF NATIVE HEART WITHOUT ANGINA PECTORIS: ICD-10-CM

## 2018-06-20 DIAGNOSIS — Z12.5 SCREENING FOR PROSTATE CANCER: ICD-10-CM

## 2018-06-20 LAB
ALBUMIN SERPL-MCNC: 3.4 G/DL (ref 3.4–5)
ALP SERPL-CCNC: 54 U/L (ref 40–150)
ALT SERPL W P-5'-P-CCNC: 36 U/L (ref 0–70)
ANION GAP SERPL CALCULATED.3IONS-SCNC: 6 MMOL/L (ref 3–14)
AST SERPL W P-5'-P-CCNC: 37 U/L (ref 0–45)
BILIRUB SERPL-MCNC: 0.5 MG/DL (ref 0.2–1.3)
BUN SERPL-MCNC: 16 MG/DL (ref 7–30)
CALCIUM SERPL-MCNC: 8.6 MG/DL (ref 8.5–10.1)
CHLORIDE SERPL-SCNC: 103 MMOL/L (ref 94–109)
CHOLEST SERPL-MCNC: 165 MG/DL
CO2 SERPL-SCNC: 28 MMOL/L (ref 20–32)
CREAT SERPL-MCNC: 0.98 MG/DL (ref 0.66–1.25)
GFR SERPL CREATININE-BSD FRML MDRD: 79 ML/MIN/1.7M2
GLUCOSE SERPL-MCNC: 93 MG/DL (ref 70–99)
HDLC SERPL-MCNC: 60 MG/DL
LDLC SERPL CALC-MCNC: 88 MG/DL
NONHDLC SERPL-MCNC: 105 MG/DL
POTASSIUM SERPL-SCNC: 4.1 MMOL/L (ref 3.4–5.3)
PROT SERPL-MCNC: 7 G/DL (ref 6.8–8.8)
PSA SERPL-ACNC: 1.09 UG/L (ref 0–4)
SODIUM SERPL-SCNC: 137 MMOL/L (ref 133–144)
TRIGL SERPL-MCNC: 84 MG/DL

## 2018-06-20 PROCEDURE — 80053 COMPREHEN METABOLIC PANEL: CPT | Performed by: INTERNAL MEDICINE

## 2018-06-20 PROCEDURE — 36415 COLL VENOUS BLD VENIPUNCTURE: CPT | Performed by: INTERNAL MEDICINE

## 2018-06-20 PROCEDURE — G0103 PSA SCREENING: HCPCS | Performed by: INTERNAL MEDICINE

## 2018-06-20 PROCEDURE — 80061 LIPID PANEL: CPT | Performed by: INTERNAL MEDICINE

## 2018-06-26 ENCOUNTER — OFFICE VISIT (OUTPATIENT)
Dept: CARDIOLOGY | Facility: CLINIC | Age: 58
End: 2018-06-26
Attending: INTERNAL MEDICINE
Payer: COMMERCIAL

## 2018-06-26 VITALS
SYSTOLIC BLOOD PRESSURE: 118 MMHG | HEART RATE: 69 BPM | DIASTOLIC BLOOD PRESSURE: 73 MMHG | WEIGHT: 175.8 LBS | BODY MASS INDEX: 24.34 KG/M2 | OXYGEN SATURATION: 96 %

## 2018-06-26 DIAGNOSIS — I49.3 PVC'S (PREMATURE VENTRICULAR CONTRACTIONS): ICD-10-CM

## 2018-06-26 DIAGNOSIS — I25.10 CORONARY ARTERY DISEASE DUE TO CALCIFIED CORONARY LESION: Primary | ICD-10-CM

## 2018-06-26 DIAGNOSIS — I25.84 CORONARY ARTERY DISEASE DUE TO CALCIFIED CORONARY LESION: Primary | ICD-10-CM

## 2018-06-26 PROCEDURE — 99214 OFFICE O/P EST MOD 30 MIN: CPT | Performed by: INTERNAL MEDICINE

## 2018-06-26 NOTE — LETTER
6/26/2018      Leatha Holliday, DO  [address] [address]      RE: Justin Cejamelony       Dear Colleague,    I had the pleasure of seeing Justin Rivas in the [facility].    Service Date: 06/26/2018      HISTORY OF PRESENT ILLNESS:  Mr. Rivas is a pleasant 58-year-old gentleman with a history of nonobstructive coronary artery disease diagnosed on a cardiac CT angiogram in 2015, which showed mild to moderate stenosis of the LAD.  A small second diagonal branch appeared to have a severe ostial stenosis but was a small-caliber vessel, and medical management was elected.  He also has a history of frequent PVCs.  There was also question of pulmonary nodules on the scan from 2015.  The repeat scan in 2016 showed this to be a benign phenomenon, and no further CT scans were recommended by Radiology.      The patient has been feeling well from a cardiovascular standpoint.  He has some atypical chest pain which is a sharp, stabbing pain on the left side of his chest lasting less than 1 minute.  It does not come on with physical exertion.  He otherwise denies any other types of chest pain or chest heaviness.  He denies any shortness of breath, orthopnea or paroxysmal nocturnal dyspnea.      The patient had blood work performed prior to today's visit showing a total cholesterol of 165, HDL 60, and LDL of 88.      Please see my separate note with his full physical examination.      IMPRESSION AND PLAN:   1.  Coronary artery disease -- the patient is clinically asymptomatic, and his chest pain that he is currently having is atypical and not cardiac.  I will continue aspirin 81 mg a day and atorvastatin 20 mg daily unchanged for secondary prevention.  We did discuss diet and lifestyle to try to drive his LDL to less than 70.  His other risk factors are well modified, including his blood pressure which was only 118/73 in the office today.   2.  Dyslipidemia -- I will continue  the patient's atorvastatin 20 mg daily unchanged for secondary prevention.  He will work on diet to lower his LDL further.   3.  History of frequent PVCs -- the patient is clinically asymptomatic and will remain on his current dose of metoprolol unchanged.      I will plan to see Mr. Narvaez back in routine clinical followup in 1 year with a repeat fasting lipid panel to be performed at that time.         MACARENA CARDENAS MD             D: 2018   T: 2018   MT: SITA      Name:     RAVINDER NARVAEZ   MRN:      [id number]        Account:      [id number]   :      1960           Service Date: 2018      Document: [id number]           Outpatient Encounter Prescriptions as of 2018   Medication Sig Dispense Refill     acetaminophen (TYLENOL) 325 MG tablet Take 2 tablets by mouth every 6 hours as needed.       aspirin EC 81 MG tablet Take 1 tablet (81 mg) by mouth daily       atorvastatin (LIPITOR) 20 MG tablet Take 1 tablet (20 mg) by mouth At Bedtime 90 tablet 3     Calcium Carbonate-Vitamin D (CALCIUM + D) 600-200 MG-UNIT per tablet Take 1 tablet by mouth daily. 100 tablet 12     fish oil-omega-3 fatty acids (FISH OIL) 1000 MG capsule 1 CAPSULE ORALLY DAILY       ibuprofen (ADVIL,MOTRIN) 200 MG tablet Take 400 mg by mouth every 4 hours as needed.       metoprolol succinate (TOPROL-XL) 25 MG 24 hr tablet Take 1 tablet (25 mg) by mouth daily And twice daily as needed 90 tablet 3     MULTI VITAMIN MENS OR TABS one daily  0     Probiotic Product ([employer] PO) 1 capsule daily       RESTASIS 0.05 % ophthalmic emulsion INSTILL 1 DROP IN BOTH EYES TWICE A  each 3     SB SALINE NOSE NA Spray 2 sprays into both nostrils 2 times daily BID two sprays, per Dr Arce.        terazosin (HYTRIN) 2 MG capsule Take 1 capsule (2 mg) by mouth 2 times daily 180 capsule 3     tiZANidine (ZANAFLEX) 2 MG tablet Take 1-2 tablets (2-4 mg) by mouth 3 times daily 30 tablet 1     No  facility-administered encounter medications on file as of 6/26/2018.        Again, thank you for allowing me to participate in the care of your patient.      Sincerely,    Jose Rod MD     [facility]

## 2018-06-26 NOTE — PROGRESS NOTES
HPI and Plan:   See dictation    Orders Placed This Encounter   Procedures     Lipid Profile     ALT     Follow-Up with Cardiologist     Follow-Up with Cardiologist       No orders of the defined types were placed in this encounter.      There are no discontinued medications.      Encounter Diagnoses   Name Primary?     Coronary artery disease due to calcified coronary lesion Yes     PVC's (premature ventricular contractions)        CURRENT MEDICATIONS:  Current Outpatient Prescriptions   Medication Sig Dispense Refill     acetaminophen (TYLENOL) 325 MG tablet Take 2 tablets by mouth every 6 hours as needed.       aspirin EC 81 MG tablet Take 1 tablet (81 mg) by mouth daily       atorvastatin (LIPITOR) 20 MG tablet Take 1 tablet (20 mg) by mouth At Bedtime 90 tablet 3     Calcium Carbonate-Vitamin D (CALCIUM + D) 600-200 MG-UNIT per tablet Take 1 tablet by mouth daily. 100 tablet 12     fish oil-omega-3 fatty acids (FISH OIL) 1000 MG capsule 1 CAPSULE ORALLY DAILY       ibuprofen (ADVIL,MOTRIN) 200 MG tablet Take 400 mg by mouth every 4 hours as needed.       metoprolol succinate (TOPROL-XL) 25 MG 24 hr tablet Take 1 tablet (25 mg) by mouth daily And twice daily as needed 90 tablet 3     MULTI VITAMIN MENS OR TABS one daily  0     Probiotic Product ([employer] PO) 1 capsule daily       RESTASIS 0.05 % ophthalmic emulsion INSTILL 1 DROP IN BOTH EYES TWICE A  each 3     SB SALINE NOSE NA Spray 2 sprays into both nostrils 2 times daily BID two sprays, per Dr Arce.        terazosin (HYTRIN) 2 MG capsule Take 1 capsule (2 mg) by mouth 2 times daily 180 capsule 3     tiZANidine (ZANAFLEX) 2 MG tablet Take 1-2 tablets (2-4 mg) by mouth 3 times daily 30 tablet 1       ALLERGIES     Allergies   Allergen Reactions     Flomax [Tamsulosin] Other (See Comments)     Lightheadedness     Penicillins Other (See Comments)     Leg became numb after taking PCN as child       PAST MEDICAL HISTORY:  Past Medical  History:   Diagnosis Date     BPH (benign prostatic hyperplasia)      Coronary artery disease     on CTa 2/2015     Family history of premature coronary heart disease     father in his 50s     Hyperlipidemia      Hyperlipidemia LDL goal <160 5/15/2013     Other premature beats      Palpitations      Ulcerative proctitis (H)      Varicose veins        PAST SURGICAL HISTORY:  Past Surgical History:   Procedure Laterality Date     ABLATE VEIN VARICOSE RADIO FREQUENCY WITHOUT PHLEBECTOMY MULTIPLE STAB  1/24/2013    Procedure: ABLATE VEIN VARICOSE RADIO FREQUENCY WITHOUT PHLEBECTOMY MULTIPLE STAB;  Left leg ablation -vein mapping at 0830;  Surgeon: Amadeo Porter MD;  Location: [facility]     COLONOSCOPY  3/30/2011    COLONOSCOPY performed by ESHA KNOWLES at [facility]       FAMILY HISTORY:  Family History   Problem Relation Age of Onset     Hypertension Mother      C.A.D. Father      Circulatory Father      blood disorder     Cerebrovascular Disease Maternal Grandmother      Cerebrovascular Disease Maternal Grandfather      Alzheimer Disease Paternal Grandmother      C.A.D. Paternal Grandmother      C.A.D. Paternal Grandfather      Cancer Brother      lung cancer at age 50     Hypertension Sister      HEART DISEASE Sister      Multiple Sclerosis Paternal Uncle        SOCIAL HISTORY:  Social History     Social History     Marital status: Single     Spouse name: N/A     Number of children: N/A     Years of education: N/A     Social History Main Topics     Smoking status: Never Smoker     Smokeless tobacco: Never Used     Alcohol use Yes      Comment: occasional     Drug use: No     Sexual activity: No     Other Topics Concern     Caffeine Concern Yes     1 cup of coffee     Exercise Yes     2-3x a week 10-15min  (total gym)     Seat Belt Yes     Self-Exams Yes     Parent/Sibling W/ Cabg, Mi Or Angioplasty Before 65f 55m? No     Social History Narrative       Review of Systems:  Skin:  Negative itching;rash      Eyes:  Negative      ENT:  Negative nasal congestion    Respiratory:  Negative       Cardiovascular:    Positive for;palpitations;chest pain;edema improved on Toprol  Gastroenterology: Negative      Genitourinary:  Positive for prostate problem;urinary frequency    Musculoskeletal:  Negative back pain;joint pain;joint swelling;joint stiffness;nocturnal cramping    Neurologic:  Negative      Psychiatric:  Negative anxiety    Heme/Lymph/Imm:  Negative      Endocrine:  Negative        Physical Exam:  Vitals: /73  Pulse 69  Wt 79.7 kg (175 lb 12.8 oz)  SpO2 96%  BMI 24.34 kg/m2    Constitutional:  cooperative;well developed;in no acute distress        Skin:  warm and dry to the touch          Head:  normocephalic        Eyes:  sclera white        Lymph:No Cervical lymphadenopathy present     ENT:  no pallor or cyanosis        Neck:  no stiffness        Respiratory:  clear to auscultation         Cardiac: regular rhythm;normal S1 and S2 bradycardic;occasional premature beats     grade 1;grade 2        pulses full and equal                                        GI:  abdomen soft        Extremities and Muscular Skeletal:  no edema              Neurological:  no gross motor deficits;affect appropriate        Psych:  Alert and Oriented x 3        CC  Jose Rod MD  [address]  [place] [address]

## 2018-06-26 NOTE — LETTER
6/26/2018    Leatha Holliday, DO  [address] [facility] [address]    RE: Justin Rivas       Dear Colleague,    I had the pleasure of seeing Justin Rivas in the [facility].    HPI and Plan:   See dictation    Orders Placed This Encounter   Procedures     Lipid Profile     ALT     Follow-Up with Cardiologist     Follow-Up with Cardiologist       No orders of the defined types were placed in this encounter.      There are no discontinued medications.      Encounter Diagnoses   Name Primary?     Coronary artery disease due to calcified coronary lesion Yes     PVC's (premature ventricular contractions)        CURRENT MEDICATIONS:  Current Outpatient Prescriptions   Medication Sig Dispense Refill     acetaminophen (TYLENOL) 325 MG tablet Take 2 tablets by mouth every 6 hours as needed.       aspirin EC 81 MG tablet Take 1 tablet (81 mg) by mouth daily       atorvastatin (LIPITOR) 20 MG tablet Take 1 tablet (20 mg) by mouth At Bedtime 90 tablet 3     Calcium Carbonate-Vitamin D (CALCIUM + D) 600-200 MG-UNIT per tablet Take 1 tablet by mouth daily. 100 tablet 12     fish oil-omega-3 fatty acids (FISH OIL) 1000 MG capsule 1 CAPSULE ORALLY DAILY       ibuprofen (ADVIL,MOTRIN) 200 MG tablet Take 400 mg by mouth every 4 hours as needed.       metoprolol succinate (TOPROL-XL) 25 MG 24 hr tablet Take 1 tablet (25 mg) by mouth daily And twice daily as needed 90 tablet 3     MULTI VITAMIN MENS OR TABS one daily  0     Probiotic Product (UpTo PO) 1 capsule daily       RESTASIS 0.05 % ophthalmic emulsion INSTILL 1 DROP IN BOTH EYES TWICE A  each 3     SB SALINE NOSE NA Spray 2 sprays into both nostrils 2 times daily BID two sprays, per Dr Arce.        terazosin (HYTRIN) 2 MG capsule Take 1 capsule (2 mg) by mouth 2 times daily 180 capsule 3     tiZANidine (ZANAFLEX) 2 MG tablet Take 1-2 tablets (2-4 mg) by mouth 3 times daily 30 tablet 1       ALLERGIES      Allergies   Allergen Reactions     Flomax [Tamsulosin] Other (See Comments)     Lightheadedness     Penicillins Other (See Comments)     Leg became numb after taking PCN as child       PAST MEDICAL HISTORY:  Past Medical History:   Diagnosis Date     BPH (benign prostatic hyperplasia)      Coronary artery disease     on CTa 2/2015     Family history of premature coronary heart disease     father in his 50s     Hyperlipidemia      Hyperlipidemia LDL goal <160 5/15/2013     Other premature beats      Palpitations      Ulcerative proctitis (H)      Varicose veins        PAST SURGICAL HISTORY:  Past Surgical History:   Procedure Laterality Date     ABLATE VEIN VARICOSE RADIO FREQUENCY WITHOUT PHLEBECTOMY MULTIPLE STAB  1/24/2013    Procedure: ABLATE VEIN VARICOSE RADIO FREQUENCY WITHOUT PHLEBECTOMY MULTIPLE STAB;  Left leg ablation -vein mapping at 0830;  Surgeon: Amadeo Porter MD;  Location: [facility]     COLONOSCOPY  3/30/2011    COLONOSCOPY performed by ESHA KNOWLES at [facility]       FAMILY HISTORY:  Family History   Problem Relation Age of Onset     Hypertension Mother      C.A.D. Father      Circulatory Father      blood disorder     Cerebrovascular Disease Maternal Grandmother      Cerebrovascular Disease Maternal Grandfather      Alzheimer Disease Paternal Grandmother      C.A.D. Paternal Grandmother      C.A.D. Paternal Grandfather      Cancer Brother      lung cancer at age 50     Hypertension Sister      HEART DISEASE Sister      Multiple Sclerosis Paternal Uncle        SOCIAL HISTORY:  Social History     Social History     Marital status: Single     Spouse name: N/A     Number of children: N/A     Years of education: N/A     Social History Main Topics     Smoking status: Never Smoker     Smokeless tobacco: Never Used     Alcohol use Yes      Comment: occasional     Drug use: No     Sexual activity: No     Other Topics Concern     Caffeine Concern Yes     1 cup of coffee     Exercise Yes     2-3x  a week 10-15min  (total gym)     Seat Belt Yes     Self-Exams Yes     Parent/Sibling W/ Cabg, Mi Or Angioplasty Before 65f 55m? No     Social History Narrative       Review of Systems:  Skin:  Negative itching;rash     Eyes:  Negative      ENT:  Negative nasal congestion    Respiratory:  Negative       Cardiovascular:    Positive for;palpitations;chest pain;edema improved on Toprol  Gastroenterology: Negative      Genitourinary:  Positive for prostate problem;urinary frequency    Musculoskeletal:  Negative back pain;joint pain;joint swelling;joint stiffness;nocturnal cramping    Neurologic:  Negative      Psychiatric:  Negative anxiety    Heme/Lymph/Imm:  Negative      Endocrine:  Negative        Physical Exam:  Vitals: /73  Pulse 69  Wt 79.7 kg (175 lb 12.8 oz)  SpO2 96%  BMI 24.34 kg/m2    Constitutional:  cooperative;well developed;in no acute distress        Skin:  warm and dry to the touch          Head:  normocephalic        Eyes:  sclera white        Lymph:No Cervical lymphadenopathy present     ENT:  no pallor or cyanosis        Neck:  no stiffness        Respiratory:  clear to auscultation         Cardiac: regular rhythm;normal S1 and S2 bradycardic;occasional premature beats     grade 1;grade 2        pulses full and equal                                        GI:  abdomen soft        Extremities and Muscular Skeletal:  no edema              Neurological:  no gross motor deficits;affect appropriate        Psych:  Alert and Oriented x 3        CC  Jose Rod MD  [address]  [place] [id number]                Thank you for allowing me to participate in the care of your patient.      Sincerely,     Jose Rod MD     [facility]    cc:   Jose Rod MD  [address]  [place] [id number]

## 2018-06-27 NOTE — PROGRESS NOTES
Service Date: 06/26/2018      HISTORY OF PRESENT ILLNESS:  Mr. Rivas is a pleasant 58-year-old gentleman with a history of nonobstructive coronary artery disease diagnosed on a cardiac CT angiogram in 2015, which showed mild to moderate stenosis of the LAD.  A small second diagonal branch appeared to have a severe ostial stenosis but was a small-caliber vessel, and medical management was elected.  He also has a history of frequent PVCs.  There was also question of pulmonary nodules on the scan from 2015.  The repeat scan in 2016 showed this to be a benign phenomenon, and no further CT scans were recommended by Radiology.      The patient has been feeling well from a cardiovascular standpoint.  He has some atypical chest pain which is a sharp, stabbing pain on the left side of his chest lasting less than 1 minute.  It does not come on with physical exertion.  He otherwise denies any other types of chest pain or chest heaviness.  He denies any shortness of breath, orthopnea or paroxysmal nocturnal dyspnea.      The patient had blood work performed prior to today's visit showing a total cholesterol of 165, HDL 60, and LDL of 88.      Please see my separate note with his full physical examination.      IMPRESSION AND PLAN:   1.  Coronary artery disease -- the patient is clinically asymptomatic, and his chest pain that he is currently having is atypical and not cardiac.  I will continue aspirin 81 mg a day and atorvastatin 20 mg daily unchanged for secondary prevention.  We did discuss diet and lifestyle to try to drive his LDL to less than 70.  His other risk factors are well modified, including his blood pressure which was only 118/73 in the office today.   2.  Dyslipidemia -- I will continue the patient's atorvastatin 20 mg daily unchanged for secondary prevention.  He will work on diet to lower his LDL further.   3.  History of frequent PVCs -- the patient is clinically asymptomatic and will remain on his current  dose of metoprolol unchanged.      I will plan to see Mr. Narvaez back in routine clinical followup in 1 year with a repeat fasting lipid panel to be performed at that time.         MACARENA CARDENAS MD             D: 2018   T: 2018   MT: SITA      Name:     RAVINDER NARVAEZ   MRN:      [id number]        Account:      [id number]   :      1960           Service Date: 2018      Document: [id number]

## 2019-02-21 ENCOUNTER — TELEPHONE (OUTPATIENT)
Dept: FAMILY MEDICINE | Facility: CLINIC | Age: 59
End: 2019-02-21

## 2019-02-21 DIAGNOSIS — H04.123 CHRONIC DRYNESS OF BOTH EYES: ICD-10-CM

## 2019-02-21 RX ORDER — CYCLOSPORINE 0.5 MG/ML
EMULSION OPHTHALMIC
Qty: 180 EACH | Refills: 3 | Status: SHIPPED | OUTPATIENT
Start: 2019-02-21

## 2019-02-21 NOTE — TELEPHONE ENCOUNTER
Requested Prescriptions   Pending Prescriptions Disp Refills     RESTASIS 0.05 % ophthalmic emulsion [Pharmacy Med Name: RESTASIS 0.05% OPHTH EMULSION] 180 each 3     Sig: INSTILL 1 DROP IN BOTH EYES TWICE A DAY    There is no refill protocol information for this order        Last Written Prescription Date:  6-23-17  Last Fill Quantity: 180,  # refills: 3   Last office visit: 4/26/2018 with prescribing provider:  Mg   Future Office Visit:

## 2019-02-21 NOTE — TELEPHONE ENCOUNTER
Routing refill request to provider for review/approval because:  Drug not on the [facility] refill protocol     Genesis GARCIA RN

## 2019-02-22 NOTE — TELEPHONE ENCOUNTER
Prior Authorization Retail Medication Request    Medication/Dose: Restasis  ICD code (if different than what is on RX):    Previously Tried and Failed:  systane  Rationale:  patient has used since 2010 with success    Insurance Name:  [employer]  Insurance ID:  Not provided  Cover My Med Key: [id number]      Pharmacy Information (if different than what is on RX)  Name:    Phone:

## 2019-03-01 NOTE — TELEPHONE ENCOUNTER
Prior Authorization Approval    Authorization Effective Date: 3/1/2019  Authorization Expiration Date: 3/1/2020  Medication: restasis  Approved Dose/Quantity: 180  Reference #: [id number]    Insurance Company: [employer] - Phone [phone] Fax [phone]  Which Pharmacy is filling the prescription (Not needed for infusion/clinic administered): [id number] - [address]  Pharmacy Notified: Yes  Patient Notified: Yes

## 2019-03-01 NOTE — TELEPHONE ENCOUNTER
PA Initiation    Medication: restasis  Insurance Company: [employer] - Phone [phone] Fax [phone]  Pharmacy Filling the Rx: [id number] - [place], [place] - [address] [place] [address]  Filling Pharmacy Phone: [phone]  Filling Pharmacy Fax:    Start Date: 3/1/2019    Central Prior Authorization Team   Phone: [phone]    Tracking Number is [id number]

## 2019-04-30 ENCOUNTER — TRANSFERRED RECORDS (OUTPATIENT)
Dept: HEALTH INFORMATION MANAGEMENT | Facility: CLINIC | Age: 59
End: 2019-04-30

## 2019-05-08 ENCOUNTER — TELEPHONE (OUTPATIENT)
Dept: INTERNAL MEDICINE | Facility: CLINIC | Age: 59
End: 2019-05-08

## 2019-05-08 DIAGNOSIS — Z12.11 SPECIAL SCREENING FOR MALIGNANT NEOPLASMS, COLON: Primary | ICD-10-CM

## 2019-05-08 NOTE — TELEPHONE ENCOUNTER
Pt would like to have a colonoscopy referral for [facility] [place].  Dr. Gillian alexander.  Please signed the order.  Route to the PCP.  Gracie Valadez CMA (AG)   (aka: Ludmila Valadez)

## 2019-05-15 ENCOUNTER — TRANSFERRED RECORDS (OUTPATIENT)
Dept: HEALTH INFORMATION MANAGEMENT | Facility: CLINIC | Age: 59
End: 2019-05-15

## 2019-05-17 DIAGNOSIS — N40.1 BENIGN NON-NODULAR PROSTATIC HYPERPLASIA WITH LOWER URINARY TRACT SYMPTOMS: ICD-10-CM

## 2019-05-17 RX ORDER — TERAZOSIN 2 MG/1
2 CAPSULE ORAL 2 TIMES DAILY
Qty: 180 CAPSULE | Refills: 3 | OUTPATIENT
Start: 2019-05-17

## 2019-05-17 NOTE — TELEPHONE ENCOUNTER
Reason for Call:  Medication or medication refill:    Do you use a [facility]?  Name of the pharmacy and phone number for the current request:  [facility] [phone]    Name of the medication requested: terazosin    Other request: LOV:  3/7/17  LAST REFILL:  2/21/19    Can we leave a detailed message on this number? YES    Phone number patient can be reached at: Home number on file [phone] (home)    Best Time: any     Call taken on 5/17/2019 at 9:08 AM by Yessenia Fagan

## 2019-05-22 ENCOUNTER — OFFICE VISIT (OUTPATIENT)
Dept: FAMILY MEDICINE | Facility: CLINIC | Age: 59
End: 2019-05-22
Payer: COMMERCIAL

## 2019-05-22 VITALS
BODY MASS INDEX: 24.08 KG/M2 | HEIGHT: 71 IN | SYSTOLIC BLOOD PRESSURE: 118 MMHG | TEMPERATURE: 97 F | HEART RATE: 82 BPM | RESPIRATION RATE: 12 BRPM | DIASTOLIC BLOOD PRESSURE: 68 MMHG | WEIGHT: 172 LBS | OXYGEN SATURATION: 98 %

## 2019-05-22 DIAGNOSIS — K51.211 ULCERATIVE PROCTITIS WITH RECTAL BLEEDING (H): ICD-10-CM

## 2019-05-22 DIAGNOSIS — Z13.220 SCREENING FOR HYPERLIPIDEMIA: ICD-10-CM

## 2019-05-22 DIAGNOSIS — N40.1 BENIGN NON-NODULAR PROSTATIC HYPERPLASIA WITH LOWER URINARY TRACT SYMPTOMS: ICD-10-CM

## 2019-05-22 DIAGNOSIS — I49.3 PVC'S (PREMATURE VENTRICULAR CONTRACTIONS): ICD-10-CM

## 2019-05-22 DIAGNOSIS — L81.4 LENTIGO: ICD-10-CM

## 2019-05-22 DIAGNOSIS — I25.10 CORONARY ARTERY DISEASE INVOLVING NATIVE CORONARY ARTERY OF NATIVE HEART WITHOUT ANGINA PECTORIS: ICD-10-CM

## 2019-05-22 DIAGNOSIS — E78.2 MIXED HYPERLIPIDEMIA: ICD-10-CM

## 2019-05-22 DIAGNOSIS — Z12.5 SPECIAL SCREENING FOR MALIGNANT NEOPLASM OF PROSTATE: ICD-10-CM

## 2019-05-22 DIAGNOSIS — Z00.00 ENCOUNTER FOR GENERAL ADULT MEDICAL EXAMINATION WITHOUT ABNORMAL FINDINGS: Primary | ICD-10-CM

## 2019-05-22 LAB
ALT SERPL W P-5'-P-CCNC: 31 U/L (ref 0–70)
ANION GAP SERPL CALCULATED.3IONS-SCNC: 2 MMOL/L (ref 3–14)
BUN SERPL-MCNC: 12 MG/DL (ref 7–30)
CALCIUM SERPL-MCNC: 8.9 MG/DL (ref 8.5–10.1)
CHLORIDE SERPL-SCNC: 105 MMOL/L (ref 94–109)
CHOLEST SERPL-MCNC: 172 MG/DL
CO2 SERPL-SCNC: 29 MMOL/L (ref 20–32)
CREAT SERPL-MCNC: 0.83 MG/DL (ref 0.66–1.25)
GFR SERPL CREATININE-BSD FRML MDRD: >90 ML/MIN/{1.73_M2}
GLUCOSE SERPL-MCNC: 101 MG/DL (ref 70–99)
HDLC SERPL-MCNC: 58 MG/DL
LDLC SERPL CALC-MCNC: 98 MG/DL
NONHDLC SERPL-MCNC: 114 MG/DL
POTASSIUM SERPL-SCNC: 3.9 MMOL/L (ref 3.4–5.3)
PSA SERPL-ACNC: 1.39 UG/L (ref 0–4)
SODIUM SERPL-SCNC: 136 MMOL/L (ref 133–144)
TRIGL SERPL-MCNC: 82 MG/DL

## 2019-05-22 PROCEDURE — 80061 LIPID PANEL: CPT | Performed by: INTERNAL MEDICINE

## 2019-05-22 PROCEDURE — 99396 PREV VISIT EST AGE 40-64: CPT | Performed by: INTERNAL MEDICINE

## 2019-05-22 PROCEDURE — 84460 ALANINE AMINO (ALT) (SGPT): CPT | Performed by: INTERNAL MEDICINE

## 2019-05-22 PROCEDURE — G0103 PSA SCREENING: HCPCS | Performed by: INTERNAL MEDICINE

## 2019-05-22 PROCEDURE — 36415 COLL VENOUS BLD VENIPUNCTURE: CPT | Performed by: INTERNAL MEDICINE

## 2019-05-22 PROCEDURE — 80048 BASIC METABOLIC PNL TOTAL CA: CPT | Performed by: INTERNAL MEDICINE

## 2019-05-22 RX ORDER — METOPROLOL SUCCINATE 25 MG/1
25 TABLET, EXTENDED RELEASE ORAL DAILY
Qty: 90 TABLET | Refills: 3 | Status: SHIPPED | OUTPATIENT
Start: 2019-05-22 | End: 2020-05-15

## 2019-05-22 RX ORDER — ATORVASTATIN CALCIUM 20 MG/1
20 TABLET, FILM COATED ORAL AT BEDTIME
Qty: 90 TABLET | Refills: 3 | Status: SHIPPED | OUTPATIENT
Start: 2019-05-22 | End: 2020-05-15

## 2019-05-22 RX ORDER — CYCLOSPORINE 0.5 MG/ML
EMULSION OPHTHALMIC
Qty: 180 EACH | Refills: 3 | Status: CANCELLED | OUTPATIENT
Start: 2019-05-22

## 2019-05-22 RX ORDER — MESALAMINE 1000 MG/1
1000 SUPPOSITORY RECTAL AT BEDTIME
COMMUNITY
End: 2020-03-11

## 2019-05-22 RX ORDER — TERAZOSIN 2 MG/1
2 CAPSULE ORAL 2 TIMES DAILY
Qty: 180 CAPSULE | Refills: 3 | Status: SHIPPED | OUTPATIENT
Start: 2019-05-22 | End: 2020-05-15

## 2019-05-22 ASSESSMENT — MIFFLIN-ST. JEOR: SCORE: 1631.44

## 2019-05-22 NOTE — PROGRESS NOTES
SUBJECTIVE:   CC: Justin Rivas is an 59 year old male who presents for preventive health visit.     Healthy Habits:    Do you get at least three servings of calcium containing foods daily (dairy, green leafy vegetables, etc.)? Not 3 a day    Amount of exercise or daily activities, outside of work: 3 to 4 day(s) per week    Problems taking medications regularly No    Medication side effects: No    Have you had an eye exam in the past two years? no    Do you see a dentist twice per year? yes    Do you have sleep apnea, excessive snoring or daytime drowsiness?no    Lab: PSA test  Refills:   Ulcerative proctitis/Colitis: PT signed a NORMA from: [facility].  Had scope last week and it showed ulcerative colitis/proctitis.  Started mesalamine 1000 mg. Has follow-up with [facility] in a few months.    Derm: has dryness/flaking on scalp.  Also nodule on forehead area      Today's PHQ-2 Score: 0  PHQ-2 ( 1999 [employer]) 5/22/2019 5/22/2019   Q1: Little interest or pleasure in doing things 0 0   Q2: Feeling down, depressed or hopeless 0 0   PHQ-2 Score 0 0       Abuse: Current or Past(Physical, Sexual or Emotional)- No  Do you feel safe in your environment? Yes    Social History     Tobacco Use     Smoking status: Never Smoker     Smokeless tobacco: Never Used   Substance Use Topics     Alcohol use: Yes     Comment: occasional     If you drink alcohol do you typically have >3 drinks per day or >7 drinks per week? No                      Last PSA:   PSA   Date Value Ref Range Status   06/20/2018 1.09 0 - 4 ug/L Final     Comment:     Assay Method:  Chemiluminescence using Siemens Vista analyzer       Reviewed orders with patient. Reviewed health maintenance and updated orders accordingly - Yes  Current Outpatient Medications   Medication Sig Dispense Refill     acetaminophen (TYLENOL) 325 MG tablet Take 2 tablets by mouth every 6 hours as needed.       aspirin EC 81 MG tablet Take 1 tablet (81 mg) by  mouth daily       atorvastatin (LIPITOR) 20 MG tablet Take 1 tablet (20 mg) by mouth At Bedtime 90 tablet 3     Calcium Carbonate-Vitamin D (CALCIUM + D) 600-200 MG-UNIT per tablet Take 1 tablet by mouth daily. 100 tablet 12     fish oil-omega-3 fatty acids (FISH OIL) 1000 MG capsule 1 CAPSULE ORALLY DAILY       ibuprofen (ADVIL,MOTRIN) 200 MG tablet Take 400 mg by mouth every 4 hours as needed.       mesalamine (CANASA) 1000 MG suppository Place 1,000 mg rectally At Bedtime       metoprolol succinate ER (TOPROL-XL) 25 MG 24 hr tablet Take 1 tablet (25 mg) by mouth daily 90 tablet 3     MULTI VITAMIN MENS OR TABS one daily  0     Probiotic Product ([employer] ([employer] PO) 1 capsule daily       RESTASIS 0.05 % ophthalmic emulsion INSTILL 1 DROP IN BOTH EYES TWICE A  each 3     SB SALINE NOSE NA Spray 2 sprays into both nostrils 2 times daily BID two sprays, per Dr Arce.        terazosin (HYTRIN) 2 MG capsule Take 1 capsule (2 mg) by mouth 2 times daily 180 capsule 3       Reviewed and updated as needed this visit by clinical staff  Tobacco  Allergies  Meds  Problems  Med Hx  Surg Hx  Fam Hx  Soc Hx          Reviewed and updated as needed this visit by Provider  Problems            ROS: negative except as noted in HPI  CONSTITUTIONAL: NEGATIVE for fever, chills, change in weight  INTEGUMENTARY/SKIN: NEGATIVE for worrisome rashes, moles or lesions  EYES: NEGATIVE for vision changes or irritation  ENT: NEGATIVE for ear, mouth and throat problems  RESP: NEGATIVE for significant cough or SOB  CV: NEGATIVE for chest pain, palpitations or peripheral edema  GI: NEGATIVE for nausea, abdominal pain, heartburn, or change in bowel habits   male: negative for dysuria, hematuria, decreased urinary stream, erectile dysfunction, urethral discharge  MUSCULOSKELETAL: NEGATIVE for significant arthralgias or myalgia  NEURO: NEGATIVE for weakness, dizziness or paresthesias  PSYCHIATRIC: NEGATIVE for changes in  "mood or affect    OBJECTIVE:   /68   Pulse 82   Temp 97  F (36.1  C) (Tympanic)   Resp 12   Ht 1.81 m (5' 11.26\")   Wt 78 kg (172 lb)   SpO2 98%   BMI 23.81 kg/m    EXAM:  GENERAL: healthy, alert and no distress  EYES: Eyes grossly normal to inspection, PERRL and conjunctivae and sclerae normal  HENT: ear canals and TM's normal, nose and mouth without ulcers or lesions  NECK: no adenopathy, no asymmetry, masses, or scars and thyroid normal to palpation  RESP: lungs clear to auscultation - no rales, rhonchi or wheezes  CV: regular rates and rhythm, normal S1 S2, no S3 or S4, no murmur, click or rub, peripheral pulses strong, no peripheral edema and  one episode of 4 beat arrhythmia, with a short pause after  ABDOMEN: soft, nontender, no hepatosplenomegaly, no masses and bowel sounds normal  MS: no gross musculoskeletal defects noted, no edema  SKIN: Pearly papule with central telangiectasia in left frontal area.  Dry flaking skin on scalp  NEURO: Normal strength and tone, mentation intact and speech normal  PSYCH: mentation appears normal, affect normal/bright      ASSESSMENT/PLAN:       ICD-10-CM    1. Encounter for general adult medical examination without abnormal findings Z00.00    2. Benign non-nodular prostatic hyperplasia with lower urinary tract symptoms N40.1 terazosin (HYTRIN) 2 MG capsule   3. Mixed hyperlipidemia E78.2 atorvastatin (LIPITOR) 20 MG tablet     ALT   4. PVC's (premature ventricular contractions) I49.3 metoprolol succinate ER (TOPROL-XL) 25 MG 24 hr tablet   5. Ulcerative proctitis with rectal bleeding (H) K51.211    6. Coronary artery disease involving native coronary artery of native heart without angina pectoris I25.10 Basic metabolic panel   7. Special screening for malignant neoplasm of prostate Z12.5 PSA, screen   8. [place] L81.4 DERMATOLOGY REFERRAL   9. Screening for hyperlipidemia Z13.220 Lipid panel reflex to direct LDL Fasting       COUNSELING:  Reviewed preventive " "health counseling, as reflected in patient instructions    Estimated body mass index is 23.81 kg/m  as calculated from the following:    Height as of this encounter: 1.81 m (5' 11.26\").    Weight as of this encounter: 78 kg (172 lb).         reports that he has never smoked. He has never used smokeless tobacco.      Counseling Resources:  ATP IV Guidelines  Pooled Cohorts Equation Calculator  FRAX Risk Assessment  ICSI Preventive Guidelines  Dietary Guidelines for Americans, 2010  USDA's MyPlate  ASA Prophylaxis  Lung CA Screening    Dr. Leatha Holliday, DO  [facility]    "

## 2019-05-22 NOTE — PATIENT INSTRUCTIONS
Preventive Health Recommendations  Male Ages 50 - 64    Yearly exam:             See your health care provider every year in order to  o   Review health changes.   o   Discuss preventive care.    o   Review your medicines if your doctor has prescribed any.     Have a cholesterol test every 5 years, or more frequently if you are at risk for high cholesterol/heart disease.     Have a diabetes test (fasting glucose) every three years. If you are at risk for diabetes, you should have this test more often.     Have a colonoscopy at age 50, or have a yearly FIT test (stool test). These exams will check for colon cancer.      Talk with your health care provider about whether or not a prostate cancer screening test (PSA) is right for you.    You should be tested each year for STDs (sexually transmitted diseases), if you re at risk.     Shots: Get a flu shot each year. Get a tetanus shot every 10 years.     Nutrition:    Eat at least 5 servings of fruits and vegetables daily.     Eat whole-grain bread, whole-wheat pasta and brown rice instead of white grains and rice.     Get adequate Calcium and Vitamin D.     Lifestyle    Exercise for at least 150 minutes a week (30 minutes a day, 5 days a week). This will help you control your weight and prevent disease.     Limit alcohol to one drink per day.     No smoking.     Wear sunscreen to prevent skin cancer.     See your dentist every six months for an exam and cleaning.     See your eye doctor every 1 to 2 years.    1. Dr. Chandra cardiology  2. Labs today, medications refilled.  3. See Dr. Liang

## 2019-07-09 ENCOUNTER — OFFICE VISIT (OUTPATIENT)
Dept: DERMATOLOGY | Facility: CLINIC | Age: 59
End: 2019-07-09
Payer: COMMERCIAL

## 2019-07-09 VITALS — DIASTOLIC BLOOD PRESSURE: 71 MMHG | SYSTOLIC BLOOD PRESSURE: 118 MMHG | OXYGEN SATURATION: 96 % | HEART RATE: 68 BPM

## 2019-07-09 DIAGNOSIS — L81.4 LENTIGO: Primary | ICD-10-CM

## 2019-07-09 DIAGNOSIS — L82.1 SEBORRHEIC KERATOSIS: ICD-10-CM

## 2019-07-09 DIAGNOSIS — D18.01 ANGIOMA OF SKIN: ICD-10-CM

## 2019-07-09 DIAGNOSIS — L57.0 AK (ACTINIC KERATOSIS): ICD-10-CM

## 2019-07-09 DIAGNOSIS — Z85.828 HISTORY OF SKIN CANCER: ICD-10-CM

## 2019-07-09 DIAGNOSIS — L82.0 INFLAMED SEBORRHEIC KERATOSIS: ICD-10-CM

## 2019-07-09 PROCEDURE — 17110 DESTRUCTION B9 LES UP TO 14: CPT | Performed by: DERMATOLOGY

## 2019-07-09 PROCEDURE — 99214 OFFICE O/P EST MOD 30 MIN: CPT | Mod: 25 | Performed by: DERMATOLOGY

## 2019-07-09 RX ORDER — CALCIPOTRIENE 50 UG/G
CREAM TOPICAL
Qty: 60 G | Refills: 0 | Status: SHIPPED | OUTPATIENT
Start: 2019-07-09 | End: 2020-03-11

## 2019-07-09 RX ORDER — FLUOROURACIL 50 MG/G
CREAM TOPICAL
Qty: 40 G | Refills: 0 | Status: SHIPPED | OUTPATIENT
Start: 2019-07-09 | End: 2020-03-11

## 2019-07-09 NOTE — PROGRESS NOTES
Justin Rivas is a 59 year old year old male patient here today for rough spots on scalp.   .  Patient states this has been present for a while.  Patient reports the following symptoms:  scale.  Patient reports the following previous treatments none.  Patient reports the following modifying factors none.  Associated symptoms: none.  Patient has no other skin complaints today.  Remainder of the HPI, Meds, PMH, Allergies, FH, and SH was reviewed in chart.      Past Medical History:   Diagnosis Date     BPH (benign prostatic hyperplasia)      Coronary artery disease     on CTa 2/2015     Family history of premature coronary heart disease     father in his 50s     Hyperlipidemia      Hyperlipidemia LDL goal <160 5/15/2013     Other premature beats      Palpitations      Skin cancer      Ulcerative proctitis (H)      Varicose veins        Past Surgical History:   Procedure Laterality Date     ABLATE VEIN VARICOSE RADIO FREQUENCY WITHOUT PHLEBECTOMY MULTIPLE STAB  1/24/2013    Procedure: ABLATE VEIN VARICOSE RADIO FREQUENCY WITHOUT PHLEBECTOMY MULTIPLE STAB;  Left leg ablation -vein mapping at 0830;  Surgeon: Amadeo Porter MD;  Location: [facility]     COLONOSCOPY  3/30/2011    COLONOSCOPY performed by ESHA KNOWLES at [facility]        Family History   Problem Relation Age of Onset     Hypertension Mother      C.A.D. Father      Circulatory Father         blood disorder     Cerebrovascular Disease Maternal Grandmother      Cerebrovascular Disease Maternal Grandfather      Alzheimer Disease Paternal Grandmother      C.A.D. Paternal Grandmother      C.A.D. Paternal Grandfather      Cancer Brother         lung cancer at age 50     Hypertension Sister      Heart Disease Sister      Multiple Sclerosis Paternal Uncle        Social History     Socioeconomic History     Marital status: Single     Spouse name: Not on file     Number of children: Not on file     Years of education: Not on file     Highest education  level: Not on file   Occupational History     Not on file   Social Needs     Financial resource strain: Not on file     Food insecurity:     Worry: Not on file     Inability: Not on file     Transportation needs:     Medical: Not on file     Non-medical: Not on file   Tobacco Use     Smoking status: Never Smoker     Smokeless tobacco: Never Used   Substance and Sexual Activity     Alcohol use: Yes     Comment: occasional     Drug use: No     Sexual activity: Never   Lifestyle     Physical activity:     Days per week: Not on file     Minutes per session: Not on file     Stress: Not on file   Relationships     Social connections:     Talks on phone: Not on file     Gets together: Not on file     Attends Denominational service: Not on file     Active member of club or organization: Not on file     Attends meetings of clubs or organizations: Not on file     Relationship status: Not on file     Intimate partner violence:     Fear of current or ex partner: Not on file     Emotionally abused: Not on file     Physically abused: Not on file     Forced sexual activity: Not on file   Other Topics Concern      Service Not Asked     Blood Transfusions Not Asked     Caffeine Concern Yes     Comment: 1 cup of coffee     Occupational Exposure Not Asked     Hobby Hazards Not Asked     Sleep Concern Not Asked     Stress Concern Not Asked     Weight Concern Not Asked     Special Diet Not Asked     Back Care Not Asked     Exercise Yes     Comment: 2-3x a week 10-15min  (total gym)     Bike Helmet Not Asked     Seat Belt Yes     Self-Exams Yes     Parent/sibling w/ CABG, MI or angioplasty before 65F 55M? No   Social History Narrative     Not on file       Outpatient Encounter Medications as of 7/9/2019   Medication Sig Dispense Refill     acetaminophen (TYLENOL) 325 MG tablet Take 2 tablets by mouth every 6 hours as needed.       aspirin EC 81 MG tablet Take 1 tablet (81 mg) by mouth daily       atorvastatin (LIPITOR) 20 MG tablet  Take 1 tablet (20 mg) by mouth At Bedtime 90 tablet 3     Calcium Carbonate-Vitamin D (CALCIUM + D) 600-200 MG-UNIT per tablet Take 1 tablet by mouth daily. 100 tablet 12     ibuprofen (ADVIL,MOTRIN) 200 MG tablet Take 400 mg by mouth every 4 hours as needed.       mesalamine (CANASA) 1000 MG suppository Place 1,000 mg rectally At Bedtime       metoprolol succinate ER (TOPROL-XL) 25 MG 24 hr tablet Take 1 tablet (25 mg) by mouth daily 90 tablet 3     MULTI VITAMIN MENS OR TABS one daily  0     Probiotic Product ([employer] PO) 1 capsule daily       RESTASIS 0.05 % ophthalmic emulsion INSTILL 1 DROP IN BOTH EYES TWICE A  each 3     SB SALINE NOSE NA Spray 2 sprays into both nostrils 2 times daily BID two sprays, per Dr Arce.        terazosin (HYTRIN) 2 MG capsule Take 1 capsule (2 mg) by mouth 2 times daily 180 capsule 3     fish oil-omega-3 fatty acids (FISH OIL) 1000 MG capsule 1 CAPSULE ORALLY DAILY       No facility-administered encounter medications on file as of 7/9/2019.              Review Of Systems  Skin: As above  Eyes: negative  Ears/Nose/Throat: negative  Respiratory: No shortness of breath, dyspnea on exertion, cough, or hemoptysis  Cardiovascular: negative  Gastrointestinal: negative  Genitourinary: negative  Musculoskeletal: negative  Neurologic: negative  Psychiatric: negative  Hematologic/Lymphatic/Immunologic: negative  Endocrine: negative      O:   NAD, WDWN, Alert & Oriented, Mood & Affect wnl, Vitals stable   Here today alone   /71   Pulse 68   SpO2 96%    General appearance normal   Vitals stable   Alert, oriented and in no acute distress      Following lymph nodes palpated: Occipital, Cervical, Supraclavicular no lad    Gritty papules ons calp   L forehead inflamed seborrheic keratosis    Stuck on papules and brown macules on trunk and ext   Red papules on trunk  Flesh colored papules on trunk     The remainder of expanded problem focused exam was unremarkable;  the following areas were examined:  scalp/hair, conjunctiva/lids, face, neck, lips, chest, ab, back, legs, , chest, digits/nails, RUE, LUE.      Eyes: Conjunctivae/lids:Normal     ENT: Lips, buccal mucosa, tongue: normal    MSK:Normal    Cardiovascular: peripheral edema none    Pulm: Breathing Normal    Lymph Nodes: No Head and Neck Lymphadenopathy     Neuro/Psych: Orientation:Normal; Mood/Affect:Normal      A/P:  1. Seborrheic keratosis, lentigo, angioma, dermal nevus, hx of non-melanoma skin cancer   2. Actinic keratosis   Efudex and dovoenex this fall  Return to clinic 6 months  Irritation discussed with patient   3. L forehead inflamed seborrheic keratosis   LN2:  Treated with LN2 for 5s for 1-2 cycles. Warned risks of blistering, pain, pigment change, scarring, and incomplete resolution.  Advised patient to return if lesions do not completely resolve.  Wound care sheet given.    BENIGN LESIONS DISCUSSED WITH PATIENT:  I discussed the specifics of tumor, prognosis, and genetics of benign lesions.  I explained that treatment of these lesions would be purely cosmetic and not medically neccessary.  I discussed with patient different removal options including excision, cautery and /or laser.      Nature and genetics of benign skin lesions dicussed with patient.  Signs and Symptoms of skin cancer discussed with patient.  Patient encouraged to perform monthly skin exams.  UV precautions reviewed with patient.  Skin care regimen reviewed with patient: Eliminate harsh soaps, i.e. Dial, zest, irsih spring; Mild soaps such as Cetaphil or Dove sensitive skin, avoid hot or cold showers, aggressive use of emollients including vanicream, cetaphil or cerave discussed with patient.    Risks of non-melanoma skin cancer discussed with patient   Return to clinic 6 months

## 2019-07-09 NOTE — LETTER
7/9/2019         RE: Justin Rivas  [address]  [facility] [address]        Dear Colleague,    Thank you for referring your patient, Justin Rivas, to the [facility]. Please see a copy of my visit note below.    Justin Rivas is a 59 year old year old male patient here today for rough spots on scalp.   .  Patient states this has been present for a while.  Patient reports the following symptoms:  scale.  Patient reports the following previous treatments none.  Patient reports the following modifying factors none.  Associated symptoms: none.  Patient has no other skin complaints today.  Remainder of the HPI, Meds, PMH, Allergies, FH, and SH was reviewed in chart.      Past Medical History:   Diagnosis Date     BPH (benign prostatic hyperplasia)      Coronary artery disease     on CTa 2/2015     Family history of premature coronary heart disease     father in his 50s     Hyperlipidemia      Hyperlipidemia LDL goal <160 5/15/2013     Other premature beats      Palpitations      Skin cancer      Ulcerative proctitis (H)      Varicose veins        Past Surgical History:   Procedure Laterality Date     ABLATE VEIN VARICOSE RADIO FREQUENCY WITHOUT PHLEBECTOMY MULTIPLE STAB  1/24/2013    Procedure: ABLATE VEIN VARICOSE RADIO FREQUENCY WITHOUT PHLEBECTOMY MULTIPLE STAB;  Left leg ablation -vein mapping at 0830;  Surgeon: Aamdeo Porter MD;  Location: [facility]     COLONOSCOPY  3/30/2011    COLONOSCOPY performed by ESHA KNOWLES at [facility]        Family History   Problem Relation Age of Onset     Hypertension Mother      C.A.D. Father      Circulatory Father         blood disorder     Cerebrovascular Disease Maternal Grandmother      Cerebrovascular Disease Maternal Grandfather      Alzheimer Disease Paternal Grandmother      C.A.D. Paternal Grandmother      C.A.D. Paternal Grandfather      Cancer Brother         lung cancer at age 50     Hypertension Sister      Heart Disease  Sister      Multiple Sclerosis Paternal Uncle        Social History     Socioeconomic History     Marital status: Single     Spouse name: Not on file     Number of children: Not on file     Years of education: Not on file     Highest education level: Not on file   Occupational History     Not on file   Social Needs     Financial resource strain: Not on file     Food insecurity:     Worry: Not on file     Inability: Not on file     Transportation needs:     Medical: Not on file     Non-medical: Not on file   Tobacco Use     Smoking status: Never Smoker     Smokeless tobacco: Never Used   Substance and Sexual Activity     Alcohol use: Yes     Comment: occasional     Drug use: No     Sexual activity: Never   Lifestyle     Physical activity:     Days per week: Not on file     Minutes per session: Not on file     Stress: Not on file   Relationships     Social connections:     Talks on phone: Not on file     Gets together: Not on file     Attends Gnosticist service: Not on file     Active member of club or organization: Not on file     Attends meetings of clubs or organizations: Not on file     Relationship status: Not on file     Intimate partner violence:     Fear of current or ex partner: Not on file     Emotionally abused: Not on file     Physically abused: Not on file     Forced sexual activity: Not on file   Other Topics Concern      Service Not Asked     Blood Transfusions Not Asked     Caffeine Concern Yes     Comment: 1 cup of coffee     Occupational Exposure Not Asked     Hobby Hazards Not Asked     Sleep Concern Not Asked     Stress Concern Not Asked     Weight Concern Not Asked     Special Diet Not Asked     Back Care Not Asked     Exercise Yes     Comment: 2-3x a week 10-15min  (total gym)     Bike Helmet Not Asked     Seat Belt Yes     Self-Exams Yes     Parent/sibling w/ CABG, MI or angioplasty before 65F 55M? No   Social History Narrative     Not on file       Outpatient Encounter Medications as of  7/9/2019   Medication Sig Dispense Refill     acetaminophen (TYLENOL) 325 MG tablet Take 2 tablets by mouth every 6 hours as needed.       aspirin EC 81 MG tablet Take 1 tablet (81 mg) by mouth daily       atorvastatin (LIPITOR) 20 MG tablet Take 1 tablet (20 mg) by mouth At Bedtime 90 tablet 3     Calcium Carbonate-Vitamin D (CALCIUM + D) 600-200 MG-UNIT per tablet Take 1 tablet by mouth daily. 100 tablet 12     ibuprofen (ADVIL,MOTRIN) 200 MG tablet Take 400 mg by mouth every 4 hours as needed.       mesalamine (CANASA) 1000 MG suppository Place 1,000 mg rectally At Bedtime       metoprolol succinate ER (TOPROL-XL) 25 MG 24 hr tablet Take 1 tablet (25 mg) by mouth daily 90 tablet 3     MULTI VITAMIN MENS OR TABS one daily  0     Probiotic Product ([employer] PO) 1 capsule daily       RESTASIS 0.05 % ophthalmic emulsion INSTILL 1 DROP IN BOTH EYES TWICE A  each 3     SB SALINE NOSE NA Spray 2 sprays into both nostrils 2 times daily BID two sprays, per Dr Arce.        terazosin (HYTRIN) 2 MG capsule Take 1 capsule (2 mg) by mouth 2 times daily 180 capsule 3     fish oil-omega-3 fatty acids (FISH OIL) 1000 MG capsule 1 CAPSULE ORALLY DAILY       No facility-administered encounter medications on file as of 7/9/2019.              Review Of Systems  Skin: As above  Eyes: negative  Ears/Nose/Throat: negative  Respiratory: No shortness of breath, dyspnea on exertion, cough, or hemoptysis  Cardiovascular: negative  Gastrointestinal: negative  Genitourinary: negative  Musculoskeletal: negative  Neurologic: negative  Psychiatric: negative  Hematologic/Lymphatic/Immunologic: negative  Endocrine: negative      O:   NAD, WDWN, Alert & Oriented, Mood & Affect wnl, Vitals stable   Here today alone   /71   Pulse 68   SpO2 96%    General appearance normal   Vitals stable   Alert, oriented and in no acute distress      Following lymph nodes palpated: Occipital, Cervical, Supraclavicular no  lad    Gritty papules ons calp   L forehead inflamed seborrheic keratosis    Stuck on papules and brown macules on trunk and ext   Red papules on trunk  Flesh colored papules on trunk     The remainder of expanded problem focused exam was unremarkable; the following areas were examined:  scalp/hair, conjunctiva/lids, face, neck, lips, chest, ab, back, legs, , chest, digits/nails, RUE, LUE.      Eyes: Conjunctivae/lids:Normal     ENT: Lips, buccal mucosa, tongue: normal    MSK:Normal    Cardiovascular: peripheral edema none    Pulm: Breathing Normal    Lymph Nodes: No Head and Neck Lymphadenopathy     Neuro/Psych: Orientation:Normal; Mood/Affect:Normal      A/P:  1. Seborrheic keratosis, lentigo, angioma, dermal nevus, hx of non-melanoma skin cancer   2. Actinic keratosis   Efudex and dovoenex this fall  Return to clinic 6 months  Irritation discussed with patient   3. L forehead inflamed seborrheic keratosis   LN2:  Treated with LN2 for 5s for 1-2 cycles. Warned risks of blistering, pain, pigment change, scarring, and incomplete resolution.  Advised patient to return if lesions do not completely resolve.  Wound care sheet given.    BENIGN LESIONS DISCUSSED WITH PATIENT:  I discussed the specifics of tumor, prognosis, and genetics of benign lesions.  I explained that treatment of these lesions would be purely cosmetic and not medically neccessary.  I discussed with patient different removal options including excision, cautery and /or laser.      Nature and genetics of benign skin lesions dicussed with patient.  Signs and Symptoms of skin cancer discussed with patient.  Patient encouraged to perform monthly skin exams.  UV precautions reviewed with patient.  Skin care regimen reviewed with patient: Eliminate harsh soaps, i.e. Dial, zest, irsih spring; Mild soaps such as Cetaphil or Dove sensitive skin, avoid hot or cold showers, aggressive use of emollients including vanicream, cetaphil or cerave discussed with  patient.    Risks of non-melanoma skin cancer discussed with patient   Return to clinic 6 months      Again, thank you for allowing me to participate in the care of your patient.        Sincerely,        Pankaj Liang MD

## 2019-07-09 NOTE — NURSING NOTE
Chief Complaint   Patient presents with     Skin Check       Vitals:    07/09/19 1358   BP: 118/71   Pulse: 68   SpO2: 96%     Wt Readings from Last 1 Encounters:   05/22/19 78 kg (172 lb)       Alyssa Holliday LPN.................7/9/2019

## 2019-11-03 ENCOUNTER — HEALTH MAINTENANCE LETTER (OUTPATIENT)
Age: 59
End: 2019-11-03

## 2019-12-27 ENCOUNTER — HOSPITAL ENCOUNTER (EMERGENCY)
Facility: CLINIC | Age: 59
Discharge: HOME | End: 2019-12-27
Attending: FAMILY MEDICINE | Admitting: FAMILY MEDICINE
Payer: OTHER MISCELLANEOUS

## 2019-12-27 ENCOUNTER — APPOINTMENT (OUTPATIENT)
Dept: GENERAL RADIOLOGY | Facility: CLINIC | Age: 59
End: 2019-12-27
Attending: FAMILY MEDICINE
Payer: OTHER MISCELLANEOUS

## 2019-12-27 VITALS
TEMPERATURE: 97.4 F | DIASTOLIC BLOOD PRESSURE: 78 MMHG | SYSTOLIC BLOOD PRESSURE: 127 MMHG | OXYGEN SATURATION: 98 % | RESPIRATION RATE: 16 BRPM

## 2019-12-27 DIAGNOSIS — R55 SYNCOPE, UNSPECIFIED SYNCOPE TYPE: ICD-10-CM

## 2019-12-27 DIAGNOSIS — S67.10XA CRUSHED FINGER, DISTAL, INITIAL ENCOUNTER: ICD-10-CM

## 2019-12-27 LAB
ANION GAP SERPL CALCULATED.3IONS-SCNC: 4 MMOL/L (ref 3–14)
BASOPHILS # BLD AUTO: 0 10E9/L (ref 0–0.2)
BASOPHILS NFR BLD AUTO: 0.4 %
BUN SERPL-MCNC: 15 MG/DL (ref 7–30)
CALCIUM SERPL-MCNC: 8.6 MG/DL (ref 8.5–10.1)
CHLORIDE SERPL-SCNC: 107 MMOL/L (ref 94–109)
CO2 SERPL-SCNC: 27 MMOL/L (ref 20–32)
CREAT SERPL-MCNC: 0.78 MG/DL (ref 0.66–1.25)
DIFFERENTIAL METHOD BLD: NORMAL
EOSINOPHIL # BLD AUTO: 0.1 10E9/L (ref 0–0.7)
EOSINOPHIL NFR BLD AUTO: 1.1 %
ERYTHROCYTE [DISTWIDTH] IN BLOOD BY AUTOMATED COUNT: 12.6 % (ref 10–15)
GFR SERPL CREATININE-BSD FRML MDRD: >90 ML/MIN/{1.73_M2}
GLUCOSE SERPL-MCNC: 126 MG/DL (ref 70–99)
HCT VFR BLD AUTO: 44.9 % (ref 40–53)
HGB BLD-MCNC: 14.2 G/DL (ref 13.3–17.7)
IMM GRANULOCYTES # BLD: 0 10E9/L (ref 0–0.4)
IMM GRANULOCYTES NFR BLD: 0.4 %
LYMPHOCYTES # BLD AUTO: 1.1 10E9/L (ref 0.8–5.3)
LYMPHOCYTES NFR BLD AUTO: 12.2 %
MCH RBC QN AUTO: 28.3 PG (ref 26.5–33)
MCHC RBC AUTO-ENTMCNC: 31.6 G/DL (ref 31.5–36.5)
MCV RBC AUTO: 89 FL (ref 78–100)
MONOCYTES # BLD AUTO: 0.4 10E9/L (ref 0–1.3)
MONOCYTES NFR BLD AUTO: 4.2 %
NEUTROPHILS # BLD AUTO: 7.4 10E9/L (ref 1.6–8.3)
NEUTROPHILS NFR BLD AUTO: 81.7 %
NRBC # BLD AUTO: 0 10*3/UL
NRBC BLD AUTO-RTO: 0 /100
PLATELET # BLD AUTO: 199 10E9/L (ref 150–450)
POTASSIUM SERPL-SCNC: 3.9 MMOL/L (ref 3.4–5.3)
RBC # BLD AUTO: 5.02 10E12/L (ref 4.4–5.9)
SODIUM SERPL-SCNC: 138 MMOL/L (ref 133–144)
TROPONIN I SERPL-MCNC: <0.015 UG/L (ref 0–0.04)
WBC # BLD AUTO: 9 10E9/L (ref 4–11)

## 2019-12-27 PROCEDURE — 84484 ASSAY OF TROPONIN QUANT: CPT | Performed by: FAMILY MEDICINE

## 2019-12-27 PROCEDURE — 93010 ELECTROCARDIOGRAM REPORT: CPT | Mod: Z6 | Performed by: FAMILY MEDICINE

## 2019-12-27 PROCEDURE — 99284 EMERGENCY DEPT VISIT MOD MDM: CPT | Mod: 25

## 2019-12-27 PROCEDURE — 80048 BASIC METABOLIC PNL TOTAL CA: CPT | Performed by: FAMILY MEDICINE

## 2019-12-27 PROCEDURE — 99284 EMERGENCY DEPT VISIT MOD MDM: CPT | Mod: 25 | Performed by: FAMILY MEDICINE

## 2019-12-27 PROCEDURE — 85025 COMPLETE CBC W/AUTO DIFF WBC: CPT | Performed by: FAMILY MEDICINE

## 2019-12-27 PROCEDURE — 73130 X-RAY EXAM OF HAND: CPT | Mod: LT

## 2019-12-27 PROCEDURE — 93005 ELECTROCARDIOGRAM TRACING: CPT

## 2019-12-27 ASSESSMENT — ENCOUNTER SYMPTOMS
BLOOD IN STOOL: 0
SHORTNESS OF BREATH: 0
VOMITING: 0
COUGH: 0
ABDOMINAL PAIN: 0
DIAPHORESIS: 0
NAUSEA: 1
WHEEZING: 0
FEVER: 0
CONSTIPATION: 0
DYSURIA: 0
HEADACHES: 0
FREQUENCY: 0
SINUS PRESSURE: 0
DIARRHEA: 0
CHILLS: 0
SORE THROAT: 0
PALPITATIONS: 0

## 2019-12-27 NOTE — ED TRIAGE NOTES
Pt here with crush injury to 3rd and 4th fingernails, smashed had in a pneumatic door at work. Became lightheaded after incident and had a syncopal episode. Still feeling queasy.

## 2019-12-27 NOTE — ED PROVIDER NOTES
History     Chief Complaint   Patient presents with     Hand Injury     smashed hand in door, bruising under 3rd and 4th fingernails.     HPI  Justin Rivas is a 59 year old male who presented with a history of coronary disease and PVCs hyperlipidemia and palpitations presented with injury to his left hand third and fourth fingers distal tuft when they were crushed in a door this morning at work.  After this occurred he felt queasy and went to go sit down and the next thing he knew his coworker was trying to awaken him in his chair as he had passed out.  This episode was apparently brief lasting for seconds.  He had no associated chest pain shortness of breath palpitations prior to this.  He had no other injuries.  Prior to this he had been feeling well without recent fever.  He did have a cough and congestion a week ago and this resolved.  He has had no other systemic symptoms recently tolerating food and fluids.  No nausea vomiting.    Most Recent Immunizations   Administered Date(s) Administered     FLU 6-35 months 09/30/2009     Influenza (H1N1) 12/16/2009     Influenza (IIV3) PF 10/15/2016     Influenza Quad, Recombinant, p-free (RIV4) 09/10/2018     Influenza Vaccine, 3 YRS +, IM (QUADRIVALENT W/PRESERVATIVES) 09/10/2015     TDAP Vaccine (Adacel) 02/23/2011         Allergies:  Allergies   Allergen Reactions     Flomax [Tamsulosin] Other (See Comments)     Lightheadedness     Penicillins Other (See Comments)     Leg became numb after taking PCN as child       Problem List:    Patient Active Problem List    Diagnosis Date Noted     Coronary artery disease involving native coronary artery of native heart without angina pectoris 03/22/2017     Priority: Medium     PVC's (premature ventricular contractions) 03/16/2016     Priority: Medium     On beta blocker, follows with cardiology.       Mixed hyperlipidemia 05/15/2013     Priority: Medium     On statin       Lentigo 03/20/2012     Priority: Medium      "Angioma 03/20/2012     Priority: Medium     Seborrheic keratosis 03/20/2012     Priority: Medium     (Problem list name updated by automated process. Provider to review and confirm.)       Left elbow pain 02/24/2012     Priority: Medium     Advanced directives, counseling/discussion 02/23/2011     Priority: Medium     Patient does not have an Advance/Health Care Directive (HCD), given \"What is Advance Care Planning?\" flyer.    Charlee Rogers  February 23, 2011         Varicose veins      Priority: Medium     Ulcerative proctitis (H)      Priority: Medium     Diagnosis on colonoscopy [facility] 4/2019.  On mesalamine suppository       Family history of premature coronary heart disease      Priority: Medium     Palpitations      Priority: Medium     Benign non-nodular prostatic hyperplasia with lower urinary tract symptoms 10/31/2008     Priority: Medium        Past Medical History:    Past Medical History:   Diagnosis Date     BPH (benign prostatic hyperplasia)      Coronary artery disease      Family history of premature coronary heart disease      Hyperlipidemia      Hyperlipidemia LDL goal <160 5/15/2013     Other premature beats      Palpitations      Skin cancer      Ulcerative proctitis (H)      Varicose veins        Past Surgical History:    Past Surgical History:   Procedure Laterality Date     ABLATE VEIN VARICOSE RADIO FREQUENCY WITHOUT PHLEBECTOMY MULTIPLE STAB  1/24/2013    Procedure: ABLATE VEIN VARICOSE RADIO FREQUENCY WITHOUT PHLEBECTOMY MULTIPLE STAB;  Left leg ablation -vein mapping at 0830;  Surgeon: Amadeo Porter MD;  Location: [facility]     COLONOSCOPY  3/30/2011    COLONOSCOPY performed by ESHA KNOWLES at [facility]       Family History:    Family History   Problem Relation Age of Onset     Hypertension Mother      C.A.D. Father      Circulatory Father         blood disorder     Cerebrovascular Disease Maternal Grandmother      Cerebrovascular Disease Maternal Grandfather      Alzheimer Disease " Paternal Grandmother      MALKA. Paternal Grandmother      MALKA. Paternal Grandfather      Cancer Brother         lung cancer at age 50     Hypertension Sister      Heart Disease Sister      Multiple Sclerosis Paternal Uncle        Social History:  Marital Status:  Single [1]  Social History     Tobacco Use     Smoking status: Never Smoker     Smokeless tobacco: Never Used   Substance Use Topics     Alcohol use: Yes     Comment: occasional     Drug use: No        Medications:    acetaminophen (TYLENOL) 325 MG tablet  aspirin EC 81 MG tablet  atorvastatin (LIPITOR) 20 MG tablet  calcipotriene (DOVONEX) 0.005 % external cream  Calcium Carbonate-Vitamin D (CALCIUM + D) 600-200 MG-UNIT per tablet  fish oil-omega-3 fatty acids (FISH OIL) 1000 MG capsule  fluorouracil (EFUDEX) 5 % external cream  ibuprofen (ADVIL,MOTRIN) 200 MG tablet  mesalamine (CANASA) 1000 MG suppository  metoprolol succinate ER (TOPROL-XL) 25 MG 24 hr tablet  MULTI VITAMIN MENS OR TABS  Probiotic Product ([employer] PO)  RESTASIS 0.05 % ophthalmic emulsion  SB SALINE NOSE NA  terazosin (HYTRIN) 2 MG capsule          Review of Systems   Constitutional: Negative for chills, diaphoresis and fever.   HENT: Negative for ear pain, sinus pressure and sore throat.    Eyes: Negative for visual disturbance.   Respiratory: Negative for cough, shortness of breath and wheezing.    Cardiovascular: Negative for chest pain and palpitations.   Gastrointestinal: Positive for nausea. Negative for abdominal pain, blood in stool, constipation, diarrhea and vomiting.   Genitourinary: Negative for dysuria, frequency and urgency.   Skin: Negative for rash.   Neurological: Negative for headaches.   All other systems reviewed and are negative.      Physical Exam   BP: (!) 146/75  Heart Rate: 57  Temp: 97.4  F (36.3  C)  Resp: 16  SpO2: 98 %      Physical Exam  Constitutional:       General: He is in acute distress.   HENT:      Head: Atraumatic.      Nose: Nose  normal. No rhinorrhea.      Mouth/Throat:      Pharynx: Oropharynx is clear.   Eyes:      Conjunctiva/sclera: Conjunctivae normal.   Neck:      Musculoskeletal: Neck supple.   Cardiovascular:      Rate and Rhythm: Normal rate and regular rhythm.      Pulses: Normal pulses.      Heart sounds: Normal heart sounds. No murmur. No friction rub. No gallop.    Pulmonary:      Effort: Pulmonary effort is normal. No respiratory distress.      Breath sounds: Normal breath sounds. No stridor. No wheezing or rhonchi.   Abdominal:      General: Abdomen is flat. Bowel sounds are normal. There is no distension.      Palpations: There is no mass.      Tenderness: There is no abdominal tenderness.      Hernia: No hernia is present.   Musculoskeletal:      Right lower leg: No edema.      Left lower leg: No edema.   Skin:     Coloration: Skin is not pale.      Findings: No rash.   Neurological:      General: No focal deficit present.      Mental Status: He is alert and oriented to person, place, and time.      Cranial Nerves: No cranial nerve deficit.      Sensory: No sensory deficit.      Motor: No weakness.       Fingers with small subungual hematomas at the base of the nail few <1 cm on each of the fingers -third and fourth left hand.  Full range of motion of the DIP PIP and MCP joints of all fingers against resistance in both flexion and extension.  No deformities.  No tenderness palpation in any of these joints.  There are minimal abrasions.    ED Course        Procedures                    EKG Interpretation:      Interpreted by Agusto Munoz MD  EKG done at 0916 hrs. demonstrates a sinus rhythm at 61 bpm with a normal axis and no ST change.  No T wave changes.  Normal R progression and no Q waves.  Normal intervals.  Normal conduction.  No ectopy.  Impression sinus rhythm 61 bpm and no acute EKG change.      Critical Care time:  none               No results found for this or any previous visit (from the past 24  hour(s)).    Medications - No data to display    Assessments & Plan (with Medical Decision Making)     MDM: Justin Rivas is a 59 year old male who presents with history of coronary disease hyper lipidemia palpitations who crushed his fingers in a doorway this morning left hand third and fourth distal brad, and then shortly thereafter felt queasy sat down and had an episode of syncope without falling out of his chair and awakened by a coworker who was present.  He had no associated cardiopulmonary symptoms before or after this occurred.  His evaluation today demonstrates only very small subungual hematomas that trephination would be unlikely to help at this point.  Will check x-rays to evaluate for tuft fracture.  Also given his underlying history of heart disease and the syncopal episode will check CBC chem8 EKG and troponin.  However suspect this was vasovagal episode related to his injury.      Tetanus appears to still be up-to-date    No fracture by x-ray of the fingers.  No findings of underlying cause for his episode of syncope or near syncope and EKG, electrolytes, troponin, orthostatic blood pressure pulse all normal.  I do suspect the episode was triggered by the pain.  At this point he has no significant collection of blood below the nail to warrant trephination but we did discuss following up if this increases.      I have reviewed the nursing notes.    I have reviewed the findings, diagnosis, plan and need for follow up with the patient.       New Prescriptions    No medications on file       Final diagnoses:   Syncope, unspecified syncope type - I suspect this was vasovagal syncope related to the painful crush injury of fingers.  No serious causes otherwise found   Crushed finger, distal, initial encounter - no signs of fracture. not enough blood collected to trephinate the nail, but may be needed       12/27/2019   [facility] EMERGENCY DEPARTMENT     Agusto Munoz MD  12/27/19 1667

## 2019-12-27 NOTE — ED AVS SNAPSHOT
[facility] Emergency Department  [address] [address]  Phone:  [phone]  Fax:  [phone]                                    Justin Rivas   MRN: [id number]    Department:  [facility] Emergency Department   Date of Visit:  12/27/2019           After Visit Summary Signature Page    I have received my discharge instructions, and my questions have been answered. I have discussed any challenges I see with this plan with the nurse or doctor.    ..........................................................................................................................................  Patient/Patient Representative Signature      ..........................................................................................................................................  Patient Representative Print Name and Relationship to Patient    ..................................................               ................................................  Date                                   Time    ..........................................................................................................................................  Reviewed by Signature/Title    ...................................................              ..............................................  Date                                               Time          22EPIC Rev 08/18

## 2020-03-05 NOTE — PROGRESS NOTES
CARDIOLOGY VISIT    REASON FOR VISIT: Coronary calcification, PVCs    SUBJECTIVE:  60-year-old male seen for coronary artery disease and PVCs.  He has a history of dyslipidemia.    He has PVCs dating back to about 2007.  He has been on metoprolol for many years.    Coronary CTA in 2015 showed calcium score 9, 57th percentile, mild to moderate stenosis of the LAD.     Overall he has been feeling fairly good recently.  He will do some light to moderate exertion with no chest pain symptoms.  Occasionally he will develop a mild chest pain at night, but never during the day.  He has some occasional palpitations consistent with his PVCs.  He has no lightheadedness, dizziness, or syncope.  He is wondering if he could try going off the metoprolol.    MEDICATIONS:  Current Outpatient Medications   Medication     acetaminophen (TYLENOL) 325 MG tablet     aspirin EC 81 MG tablet     atorvastatin (LIPITOR) 20 MG tablet     calcipotriene (DOVONEX) 0.005 % external cream     Calcium Carbonate-Vitamin D (CALCIUM + D) 600-200 MG-UNIT per tablet     fish oil-omega-3 fatty acids (FISH OIL) 1000 MG capsule     fluorouracil (EFUDEX) 5 % external cream     ibuprofen (ADVIL,MOTRIN) 200 MG tablet     mesalamine (CANASA) 1000 MG suppository     metoprolol succinate ER (TOPROL-XL) 25 MG 24 hr tablet     MULTI VITAMIN MENS OR TABS     Probiotic Product ([employer] PO)     RESTASIS 0.05 % ophthalmic emulsion     SB SALINE NOSE NA     terazosin (HYTRIN) 2 MG capsule     No current facility-administered medications for this visit.        ALLERGIES:  Allergies   Allergen Reactions     Flomax [Tamsulosin] Other (See Comments)     Lightheadedness     Penicillins Other (See Comments)     Leg became numb after taking PCN as child       REVIEW OF SYSTEMS:  Constitutional:  No weight loss, fever, chills, weakness or fatigue.  HEENT:  Eyes:  No visual loss, blurred vision, double vision or yellow sclerae. No hearing loss, sneezing,  congestion, runny nose or sore throat.  Skin:  No rash or itching.  Cardiovascular: per HPI  Respiratory: per HPI  GI:  No anorexia, nausea, vomiting or diarrhea. No abdominal pain or blood.  :  No dysurea, hematuria  Neurologic:  No headache, dizziness, syncope, paralysis, ataxia, numbness or tingling in the extremities. No change in bowel or bladder control.  Musculoskeletal:  No muscle, back pain, joint pain or stiffness.  Hematologic:  No anemia, bleeding or bruising.  Lymphatics:  No enlarged nodes. No history of splenectomy.  Psychiatric:  No history of depression or anxiety.  Endocrine:  No reports of sweating, cold or heat intolerance. No polyuria or polydipsia.  Allergies:  No history of asthma, hives, eczema or rhinitis.    PHYSICAL EXAM:  /69 (BP Location: Right arm, Patient Position: Sitting, Cuff Size: Adult Large)   Pulse 61   Wt 81.3 kg (179 lb 3.2 oz)   SpO2 95%   BMI 24.81 kg/m      Constitutional: awake, alert, no distress  Eyes: PERRL, sclera nonicteric  ENT: trachea midline  Respiratory: lungs clear  Cardiovascular: Regular rate and rhythm, single ectopy, no murmurs  GI: nondistended, nontender, bowel sounds present  Lymph/Hematologic: no lymphadenopathy  Skin: dry, no rash  Musculoskeletal: good muscle tone, strength 5/5 in upper and lower extremities  Neurologic: no focal deficits  Neuropsychiatric: appropriate affact    DATA:  Lab: December 2019: Creatinine 0.8, potassium 3.9  Recent Labs   Lab Test 05/22/19  1020 06/20/18  0635  05/21/15  0905 03/10/14  1111   CHOL 172 165   < > 148 188   HDL 58 60   < > 57 60   LDL 98 88   < > 79 109   TRIG 82 84   < > 62 93   CHOLHDLRATIO  --   --   --  2.6 3.0    < > = values in this interval not displayed.       ASSESSMENT:  60-year-old male seen for follow-up of coronary calcification and PVCs.  Overall he is doing fairly well.  He has occasional symptoms which could be from his PVCs.  He would like to try weaning off metoprolol, this seems  reasonable.  If he had more palpitations, he will go back on it.    He does have some atypical chest pain at night.  Coronary CTA will be done to follow-up on the mild to moderate LAD lesion and reassess the calcium score.    It was explained that if CTA shows a possible severe LAD lesion, we then discussed doing a coronary angiogram.  If there is an intermediate lesion, sometimes a stress test will be done to assess for ischemia.    RECOMMENDATIONS:  1.  Coronary calcification with atypical chest pain  - Coronary CTA, continue metoprolol for the scan  - Continue aspirin and statin    2. History symptomatic PVCs  -Try weaning off metoprolol over 2 weeks, if palpitations increase, go back on metoprolol    Follow-up in 1 year.    Pradeep Chandra MD  Cardiology - [facility]  Pager:  [phone]  Text Page  March 11, 2020

## 2020-03-11 ENCOUNTER — OFFICE VISIT (OUTPATIENT)
Dept: CARDIOLOGY | Facility: CLINIC | Age: 60
End: 2020-03-11
Payer: COMMERCIAL

## 2020-03-11 VITALS
DIASTOLIC BLOOD PRESSURE: 69 MMHG | BODY MASS INDEX: 24.81 KG/M2 | SYSTOLIC BLOOD PRESSURE: 131 MMHG | OXYGEN SATURATION: 95 % | HEART RATE: 61 BPM | WEIGHT: 179.2 LBS

## 2020-03-11 DIAGNOSIS — R07.89 ATYPICAL CHEST PAIN: Primary | ICD-10-CM

## 2020-03-11 DIAGNOSIS — I25.10 CORONARY ARTERY CALCIFICATION: ICD-10-CM

## 2020-03-11 DIAGNOSIS — I49.3 PVC'S (PREMATURE VENTRICULAR CONTRACTIONS): ICD-10-CM

## 2020-03-11 PROCEDURE — 99214 OFFICE O/P EST MOD 30 MIN: CPT | Performed by: INTERNAL MEDICINE

## 2020-03-11 NOTE — PATIENT INSTRUCTIONS
"1. Wean off metoprolol after your CT scan.  If your PVC's increase, you can go back on the metoprolol.    2.  CT Angiogram    Will schedule a coronary CTA.  This is a CT scan where we take pictures of your heart while contrast is injected through an IV in the vein.  The contrast fills the arteries of the heart and we are able to get a good look at how much plaque or blockage there may be in the coronary arteries.  The amount of plaque will also be added up and reported as a calcium score.  This test cannot give specific percentiles of a blockage, but can tell if there is a mild, moderate, or severe blockage.  If there are any severe blockages, usually the next step is to discuss a coronary angiogram, which is the definitive test to look at the heart arteries and fix any severe blockages by placing a stent.    Your procedure will be done at [facility] [address].   3rd floor of [facility]~[address]~[place], [place]. Please park in the  [address]  ramp on the west side of [facility] on [address]. Take the skyway over [facility] to the hospital. Go up one floor to the 3rd floor, where your procedure will be done.  How do I get ready for this exam?  Please allow two hours for this test. Bring any scans or x-rays taken at other hospitals if similar tests were done. Also bring a current list of your medications including vitamins, minerals and over-the-counter medications. It is safest to leave personal items at home.     The day before your exam, drink extra fluids, water is best. Drink at least six 8 oz glasses unless your doctor has asked you to restrict your fluid intake.    No caffeine AND No smoking the day of your test.    Do not eat or drink for 3 hours before your exam.    If you are not on a beta blocker medication you will be taking 2 doses of a medication called, \"Metoprolol\". Take 50 mg the evening before your procedure and 50 mg the morning of your " procedure. This medication is used to slow your heart rate so we can get clear pictures.    You may have or need a blood test (creatinine test) within 30 days of your exam. Staff will let you know when you are there.     You may take your morning medications with a small sip of water unless below.    Do not take diuretics (furosemide; HCTZ; torsemide; bumex) the morning of your test.    Do not take NSAIDS (ibuprofen; naproxen; indomethacin) the morning of your test.    Do not take your oral diabetic medication(s) the day of your test. Restart these medication(s) 48 hours after testing.    Do not take Viagra, Levitra or Cialis for 48 hours prior to your test.  Be sure to tell your doctor:    If you have any allergies, including any reaction to contrast.     If there is a chance you may be pregnant.    If you are breastfeeding.    If you have any special needs.  What happens during the exam?  Please wear loose clothing such as a sweat suit or jogging clothes. Avoid snaps, zippers and other metal. We may have you undress and put on a hospital gown.    We will place pads (EKG leads) on your chest.    You will lie on a table with your arms extended above your head. The table will move through the scanner. The scanner is a short tube. You will not be enclosed.    We will place a small needle (IV) into a vein in your arm. The contrast fluid flows through this needle. You may received medicine to slow your heart rate as well.    The scanner will take a series of pictures.You must lie very still during this time but you can talk to staff via a 2-way speaker system.  Is it safe?  As with any scan that uses radiation there is a very small increased risk of cancer. Your doctor orders a scan when he or she feels the potential benefits outweigh the risks of the scan. Please talk with your doctor if you have any concerns before your exam.  When will I know the result?  The doctor who did your exam and the radiologist (x-ray  doctor) will prepare a report of your results. The report will be sent to your doctor who will discuss the result with you.  Who should I call with questions?  Please call the [facility] diagnostic imaging scheduling department at [phone]  with any questions.

## 2020-03-11 NOTE — LETTER
3/11/2020    Leatha Yasmineamandeep Holliday, DO  [address] [facility] [address]    RE: Justin Rivas       Dear Colleague,    I had the pleasure of seeing Justin Rivas in the [facility].    CARDIOLOGY VISIT    REASON FOR VISIT: Coronary calcification, PVCs    SUBJECTIVE:  60-year-old male seen for coronary artery disease and PVCs.  He has a history of dyslipidemia.    He has PVCs dating back to about 2007.  He has been on metoprolol for many years.    Coronary CTA in 2015 showed calcium score 9, 57th percentile, mild to moderate stenosis of the LAD.     Overall he has been feeling fairly good recently.  He will do some light to moderate exertion with no chest pain symptoms.  Occasionally he will develop a mild chest pain at night, but never during the day.  He has some occasional palpitations consistent with his PVCs.  He has no lightheadedness, dizziness, or syncope.  He is wondering if he could try going off the metoprolol.    MEDICATIONS:  Current Outpatient Medications   Medication     acetaminophen (TYLENOL) 325 MG tablet     aspirin EC 81 MG tablet     atorvastatin (LIPITOR) 20 MG tablet     calcipotriene (DOVONEX) 0.005 % external cream     Calcium Carbonate-Vitamin D (CALCIUM + D) 600-200 MG-UNIT per tablet     fish oil-omega-3 fatty acids (FISH OIL) 1000 MG capsule     fluorouracil (EFUDEX) 5 % external cream     ibuprofen (ADVIL,MOTRIN) 200 MG tablet     mesalamine (CANASA) 1000 MG suppository     metoprolol succinate ER (TOPROL-XL) 25 MG 24 hr tablet     MULTI VITAMIN MENS OR TABS     Probiotic Product (Glance App PO)     RESTASIS 0.05 % ophthalmic emulsion     SB SALINE NOSE NA     terazosin (HYTRIN) 2 MG capsule     No current facility-administered medications for this visit.        ALLERGIES:  Allergies   Allergen Reactions     Flomax [Tamsulosin] Other (See Comments)     Lightheadedness     Penicillins Other (See Comments)     Leg became numb after taking  PCN as child       REVIEW OF SYSTEMS:  Constitutional:  No weight loss, fever, chills, weakness or fatigue.  HEENT:  Eyes:  No visual loss, blurred vision, double vision or yellow sclerae. No hearing loss, sneezing, congestion, runny nose or sore throat.  Skin:  No rash or itching.  Cardiovascular: per HPI  Respiratory: per HPI  GI:  No anorexia, nausea, vomiting or diarrhea. No abdominal pain or blood.  :  No dysurea, hematuria  Neurologic:  No headache, dizziness, syncope, paralysis, ataxia, numbness or tingling in the extremities. No change in bowel or bladder control.  Musculoskeletal:  No muscle, back pain, joint pain or stiffness.  Hematologic:  No anemia, bleeding or bruising.  Lymphatics:  No enlarged nodes. No history of splenectomy.  Psychiatric:  No history of depression or anxiety.  Endocrine:  No reports of sweating, cold or heat intolerance. No polyuria or polydipsia.  Allergies:  No history of asthma, hives, eczema or rhinitis.    PHYSICAL EXAM:  /69 (BP Location: Right arm, Patient Position: Sitting, Cuff Size: Adult Large)   Pulse 61   Wt 81.3 kg (179 lb 3.2 oz)   SpO2 95%   BMI 24.81 kg/m      Constitutional: awake, alert, no distress  Eyes: PERRL, sclera nonicteric  ENT: trachea midline  Respiratory: lungs clear  Cardiovascular: Regular rate and rhythm, single ectopy, no murmurs  GI: nondistended, nontender, bowel sounds present  Lymph/Hematologic: no lymphadenopathy  Skin: dry, no rash  Musculoskeletal: good muscle tone, strength 5/5 in upper and lower extremities  Neurologic: no focal deficits  Neuropsychiatric: appropriate affact    DATA:  Lab: December 2019: Creatinine 0.8, potassium 3.9  Recent Labs   Lab Test 05/22/19  1020 06/20/18  0635  05/21/15  0905 03/10/14  1111   CHOL 172 165   < > 148 188   HDL 58 60   < > 57 60   LDL 98 88   < > 79 109   TRIG 82 84   < > 62 93   CHOLHDLRATIO  --   --   --  2.6 3.0    < > = values in this interval not displayed.        ASSESSMENT:  60-year-old male seen for follow-up of coronary calcification and PVCs.  Overall he is doing fairly well.  He has occasional symptoms which could be from his PVCs.  He would like to try weaning off metoprolol, this seems reasonable.  If he had more palpitations, he will go back on it.    He does have some atypical chest pain at night.  Coronary CTA will be done to follow-up on the mild to moderate LAD lesion and reassess the calcium score.    It was explained that if CTA shows a possible severe LAD lesion, we then discussed doing a coronary angiogram.  If there is an intermediate lesion, sometimes a stress test will be done to assess for ischemia.    RECOMMENDATIONS:  1.  Coronary calcification with atypical chest pain  - Coronary CTA, continue metoprolol for the scan  - Continue aspirin and statin    2. History symptomatic PVCs  -Try weaning off metoprolol over 2 weeks, if palpitations increase, go back on metoprolol    Follow-up in 1 year.    Pradeep Chandra MD  Cardiology - [facility]  Pager:  [phone]  Text Page  March 11, 2020              Thank you for allowing me to participate in the care of your patient.    Sincerely,     Pradeep Chandra MD     [facility]

## 2020-05-09 ENCOUNTER — TELEPHONE (OUTPATIENT)
Dept: FAMILY MEDICINE | Facility: CLINIC | Age: 60
End: 2020-05-09

## 2020-05-09 DIAGNOSIS — N40.1 BENIGN NON-NODULAR PROSTATIC HYPERPLASIA WITH LOWER URINARY TRACT SYMPTOMS: ICD-10-CM

## 2020-05-09 DIAGNOSIS — E78.2 MIXED HYPERLIPIDEMIA: ICD-10-CM

## 2020-05-09 DIAGNOSIS — I49.3 PVC'S (PREMATURE VENTRICULAR CONTRACTIONS): ICD-10-CM

## 2020-05-11 NOTE — TELEPHONE ENCOUNTER
"Requested Prescriptions   Pending Prescriptions Disp Refills     terazosin (HYTRIN) 2 MG capsule [Pharmacy Med Name: TERAZOSIN 2MG CAPSULE] 180 capsule 3     Sig: TAKE 1 CAPSULE BY MOUTH TWICE DAILY  Last Written Prescription Date:  5/22/2019  Last Fill Quantity: 180,  # refills: 3   Last office visit: 5/22/2019 with prescribing provider:  Mg   Future Office Visit:                 Antiadrenergic Antihypertensives Failed - 5/9/2020 10:26 AM        Failed - Recent (6 mo) or future (30 days) visit within the authorizing provider's specialty     Patient had office visit in the last 6 months or has a visit in the next 30 days with authorizing provider or within the authorizing provider's specialty.  See \"Patient Info\" tab in inbasket, or \"Choose Columns\" in Meds & Orders section of the refill encounter.            Passed - Blood pressure less than 140/90 in past 6 months     BP Readings from Last 3 Encounters:   03/11/20 131/69   12/27/19 127/78   07/09/19 118/71                 Passed - Medication is active on med list        Passed - Patient is age 18 or older        Passed - Normal serum creatinine on file in past 12 months     Recent Labs   Lab Test 12/27/19  0925   CR 0.78       Ok to refill medication if creatinine is low         Alpha Blockers Passed - 5/9/2020 10:26 AM        Passed - Blood pressure under 140/90 in past 12 months     BP Readings from Last 3 Encounters:   03/11/20 131/69   12/27/19 127/78   07/09/19 118/71                 Passed - Recent (12 mo) or future (30 days) visit within the authorizing provider's specialty     Patient has had an office visit with the authorizing provider or a provider within the authorizing providers department within the previous 12 mos or has a future within next 30 days. See \"Patient Info\" tab in inbasket, or \"Choose Columns\" in Meds & Orders section of the refill encounter.              Passed - Patient does not have Tadalafil, Vardenafil, or Sildenafil on their " "medication list        Passed - Medication is active on med list        Passed - Patient is 18 years of age or older           metoprolol succinate ER (TOPROL-XL) 25 MG 24 hr tablet [Pharmacy Med Name: METOPROLOL SUCC 25MG ER TABLET] 90 tablet 3     Sig: TAKE 1 TABLET BY MOUTH DAILY  Last Written Prescription Date:  5/22/2019  Last Fill Quantity: 90,  # refills: 3   Last office visit: 5/22/2019 with prescribing provider:  Mg Langston Office Visit:                 Beta-Blockers Protocol Passed - 5/9/2020 10:26 AM        Passed - Blood pressure under 140/90 in past 12 months     BP Readings from Last 3 Encounters:   03/11/20 131/69   12/27/19 127/78   07/09/19 118/71                 Passed - Patient is age 6 or older        Passed - Recent (12 mo) or future (30 days) visit within the authorizing provider's specialty     Patient has had an office visit with the authorizing provider or a provider within the authorizing providers department within the previous 12 mos or has a future within next 30 days. See \"Patient Info\" tab in inbasket, or \"Choose Columns\" in Meds & Orders section of the refill encounter.              Passed - Medication is active on med list           atorvastatin (LIPITOR) 20 MG tablet [Pharmacy Med Name: ATORVASTATIN 20MG TABLET] 90 tablet 3     Sig: TAKE 1 TABLET BY MOUTH DAILY AT BEDTIME  Last Written Prescription Date:  5/22/2019  Last Fill Quantity: 90,  # refills: 3   Last office visit: 5/22/2019 with prescribing provider:  Mg Langston Office Visit:                 Statins Protocol Passed - 5/9/2020 10:26 AM        Passed - LDL on file in past 12 months     Recent Labs   Lab Test 05/22/19  1020   LDL 98             Passed - No abnormal creatine kinase in past 12 months     Recent Labs   Lab Test 05/21/15  0905                   Passed - Recent (12 mo) or future (30 days) visit within the authorizing provider's specialty     Patient has had an office visit with the authorizing " "provider or a provider within the authorizing providers department within the previous 12 mos or has a future within next 30 days. See \"Patient Info\" tab in inbasket, or \"Choose Columns\" in Meds & Orders section of the refill encounter.              Passed - Medication is active on med list        Passed - Patient is age 18 or older             "

## 2020-05-13 NOTE — TELEPHONE ENCOUNTER
Message left for patient to return call to clinic.  CSS - ok to deliver message below.    Genesis GARCIA RN BSN

## 2020-05-14 RX ORDER — TERAZOSIN 2 MG/1
CAPSULE ORAL
Qty: 180 CAPSULE | Refills: 3 | OUTPATIENT
Start: 2020-05-14

## 2020-05-14 RX ORDER — ATORVASTATIN CALCIUM 20 MG/1
TABLET, FILM COATED ORAL
Qty: 30 TABLET | Refills: 3 | OUTPATIENT
Start: 2020-05-14

## 2020-05-14 RX ORDER — METOPROLOL SUCCINATE 25 MG/1
TABLET, EXTENDED RELEASE ORAL
Qty: 90 TABLET | Refills: 3 | OUTPATIENT
Start: 2020-05-14

## 2020-05-15 ENCOUNTER — VIRTUAL VISIT (OUTPATIENT)
Dept: FAMILY MEDICINE | Facility: CLINIC | Age: 60
End: 2020-05-15
Payer: COMMERCIAL

## 2020-05-15 DIAGNOSIS — K51.20 ULCERATIVE PROCTITIS WITHOUT COMPLICATION (H): ICD-10-CM

## 2020-05-15 DIAGNOSIS — I49.3 PVC'S (PREMATURE VENTRICULAR CONTRACTIONS): ICD-10-CM

## 2020-05-15 DIAGNOSIS — N40.1 BENIGN NON-NODULAR PROSTATIC HYPERPLASIA WITH LOWER URINARY TRACT SYMPTOMS: Primary | ICD-10-CM

## 2020-05-15 DIAGNOSIS — E78.2 MIXED HYPERLIPIDEMIA: ICD-10-CM

## 2020-05-15 PROCEDURE — 99214 OFFICE O/P EST MOD 30 MIN: CPT | Mod: TEL | Performed by: INTERNAL MEDICINE

## 2020-05-15 RX ORDER — TERAZOSIN 2 MG/1
2 CAPSULE ORAL 2 TIMES DAILY
Qty: 180 CAPSULE | Refills: 3 | Status: SHIPPED | OUTPATIENT
Start: 2020-05-15 | End: 2021-07-06

## 2020-05-15 RX ORDER — METOPROLOL SUCCINATE 25 MG/1
25 TABLET, EXTENDED RELEASE ORAL DAILY
Qty: 90 TABLET | Refills: 3 | Status: SHIPPED | OUTPATIENT
Start: 2020-05-15 | End: 2021-07-16

## 2020-05-15 RX ORDER — ATORVASTATIN CALCIUM 20 MG/1
20 TABLET, FILM COATED ORAL AT BEDTIME
Qty: 90 TABLET | Refills: 3 | Status: SHIPPED | OUTPATIENT
Start: 2020-05-15 | End: 2021-07-16

## 2020-05-15 NOTE — PROGRESS NOTES
"Justin Rivas is a 60 year old male who is being evaluated via a billable telephone visit.      The patient has been notified of following:     \"This telephone visit will be conducted via a call between you and your physician/provider. We have found that certain health care needs can be provided without the need for a physical exam.  This service lets us provide the care you need with a short phone conversation.  If a prescription is necessary we can send it directly to your pharmacy.  If lab work is needed we can place an order for that and you can then stop by our lab to have the test done at a later time.    Telephone visits are billed at different rates depending on your insurance coverage. During this emergency period, for some insurers they may be billed the same as an in-person visit.  Please reach out to your insurance provider with any questions.    If during the course of the call the physician/provider feels a telephone visit is not appropriate, you will not be charged for this service.\"    Patient has given verbal consent for Telephone visit?  Yes    What phone number would you like to be contacted at? [phone]     How would you like to obtain your AVS? Chintanhart    Subjective     Justin Rivas is a 60 year old male who presents to clinic today for the following health issues:    HPI   Chief Complaint   Patient presents with     Lipids     Hypertension     Refill Request     Blood Draw     Patient has been set up for a lab visit 5-21-20 at 3:40pm [facility]       Hyperlipidemia Follow-Up      Are you regularly taking any medication or supplement to lower your cholesterol?   Yes- lipitor    Are you having muscle aches or other side effects that you think could be caused by your cholesterol lowering medication?  No    Hypertension Follow-up      Do you check your blood pressure regularly outside of the clinic? Yes     Are you following a low salt diet? Yes    Are your blood pressures ever more " than 140 on the top number (systolic) OR more   than 90 on the bottom number (diastolic), for example 140/90? No      How many servings of fruits and vegetables do you eat daily?  2-3    On average, how many sweetened beverages do you drink each day (Examples: soda, juice, sweet tea, etc.  Do NOT count diet or artificially sweetened beverages)?   0    How many days per week do you exercise enough to make your heart beat faster? 6    How many minutes a day do you exercise enough to make your heart beat faster? 20 - 29    How many days per week do you miss taking your medication? 0    Medication Followup of all medications listed in      Taking Medication as prescribed: yes    Side Effects:  None    Medication Helping Symptoms:  yes              Current Outpatient Medications   Medication Sig Dispense Refill     acetaminophen (TYLENOL) 325 MG tablet Take 2 tablets by mouth every 6 hours as needed.       aspirin EC 81 MG tablet Take 1 tablet (81 mg) by mouth daily       atorvastatin (LIPITOR) 20 MG tablet Take 1 tablet (20 mg) by mouth At Bedtime 90 tablet 3     Calcium Carbonate-Vitamin D (CALCIUM + D) 600-200 MG-UNIT per tablet Take 1 tablet by mouth daily. 100 tablet 12     fish oil-omega-3 fatty acids (FISH OIL) 1000 MG capsule 1 CAPSULE ORALLY DAILY       metoprolol succinate ER (TOPROL-XL) 25 MG 24 hr tablet Take 1 tablet (25 mg) by mouth daily 90 tablet 3     MULTI VITAMIN MENS OR TABS one daily  0     Probiotic Product (Studio SBV PO) 1 capsule daily       RESTASIS 0.05 % ophthalmic emulsion INSTILL 1 DROP IN BOTH EYES TWICE A  each 3     terazosin (HYTRIN) 2 MG capsule Take 1 capsule (2 mg) by mouth 2 times daily 180 capsule 3     ibuprofen (ADVIL,MOTRIN) 200 MG tablet Take 400 mg by mouth every 4 hours as needed.       SB SALINE NOSE NA Spray 2 sprays into both nostrils 2 times daily BID two sprays, per Dr Arce.          Reviewed and updated as needed this visit by  Provider  Problems         Review of Systems   Constitutional, HEENT, cardiovascular, pulmonary, gi and gu systems are negative, except as otherwise noted.       Objective   Reported vitals:  There were no vitals taken for this visit.   healthy, alert and no distress  PSYCH: Alert and oriented times 3; coherent speech, normal   rate and volume, able to articulate logical thoughts, able   to abstract reason, no tangential thoughts, no hallucinations   or delusions  His affect is normal  RESP: No cough, no audible wheezing, able to talk in full sentences  Remainder of exam unable to be completed due to telephone visits            Assessment/Plan:  1. Benign non-nodular prostatic hyperplasia with lower urinary tract symptoms -  - stable, refill provided  - terazosin (HYTRIN) 2 MG capsule; Take 1 capsule (2 mg) by mouth 2 times daily  Dispense: 180 capsule; Refill: 3  - **Prostate spec antigen screen FUTURE anytime; Future  - UROLOGY ADULT REFERRAL    2. PVC's (premature ventricular contractions) -  - stable, refill provided  - metoprolol succinate ER (TOPROL-XL) 25 MG 24 hr tablet; Take 1 tablet (25 mg) by mouth daily  Dispense: 90 tablet; Refill: 3  - Basic metabolic panel; Future    3. Mixed hyperlipidemia -  - stable, refill provided  - atorvastatin (LIPITOR) 20 MG tablet; Take 1 tablet (20 mg) by mouth At Bedtime  Dispense: 90 tablet; Refill: 3  - ALT; Future  - Basic metabolic panel; Future  - Lipid panel reflex to direct LDL Fasting; Future    4. Ulcerative proctitis without complication (H) - stable, controlled      Return in about 1 week (around 5/22/2020) for Lab Work.      Phone call duration:  20 minutes    Leatha Holliday DO

## 2020-05-28 DIAGNOSIS — N40.1 BENIGN NON-NODULAR PROSTATIC HYPERPLASIA WITH LOWER URINARY TRACT SYMPTOMS: ICD-10-CM

## 2020-05-28 DIAGNOSIS — E78.2 MIXED HYPERLIPIDEMIA: ICD-10-CM

## 2020-05-28 DIAGNOSIS — I49.3 PVC'S (PREMATURE VENTRICULAR CONTRACTIONS): ICD-10-CM

## 2020-05-28 LAB
ALT SERPL W P-5'-P-CCNC: 45 U/L (ref 0–70)
ANION GAP SERPL CALCULATED.3IONS-SCNC: 3 MMOL/L (ref 3–14)
BUN SERPL-MCNC: 17 MG/DL (ref 7–30)
CALCIUM SERPL-MCNC: 9 MG/DL (ref 8.5–10.1)
CHLORIDE SERPL-SCNC: 105 MMOL/L (ref 94–109)
CHOLEST SERPL-MCNC: 190 MG/DL
CO2 SERPL-SCNC: 28 MMOL/L (ref 20–32)
CREAT SERPL-MCNC: 0.9 MG/DL (ref 0.66–1.25)
GFR SERPL CREATININE-BSD FRML MDRD: >90 ML/MIN/{1.73_M2}
GLUCOSE SERPL-MCNC: 108 MG/DL (ref 70–99)
HDLC SERPL-MCNC: 71 MG/DL
LDLC SERPL CALC-MCNC: 103 MG/DL
NONHDLC SERPL-MCNC: 119 MG/DL
POTASSIUM SERPL-SCNC: 4.2 MMOL/L (ref 3.4–5.3)
PSA SERPL-ACNC: 1.11 UG/L (ref 0–4)
SODIUM SERPL-SCNC: 136 MMOL/L (ref 133–144)
TRIGL SERPL-MCNC: 80 MG/DL

## 2020-05-28 PROCEDURE — 80048 BASIC METABOLIC PNL TOTAL CA: CPT | Performed by: INTERNAL MEDICINE

## 2020-05-28 PROCEDURE — 84460 ALANINE AMINO (ALT) (SGPT): CPT | Performed by: INTERNAL MEDICINE

## 2020-05-28 PROCEDURE — 36415 COLL VENOUS BLD VENIPUNCTURE: CPT | Performed by: INTERNAL MEDICINE

## 2020-05-28 PROCEDURE — 80061 LIPID PANEL: CPT | Performed by: INTERNAL MEDICINE

## 2020-05-28 PROCEDURE — G0103 PSA SCREENING: HCPCS | Performed by: INTERNAL MEDICINE

## 2020-05-28 NOTE — LETTER
May 28, 2020      Justin Rivas  [address] [address]        Dear ,    We are writing to inform you of your test results.    Kidney and liver function, electrolytes are normal.     Fasting blood sugar is mildly elevated, similar to previous values seen over the last 3 years     Cholesterol slightly higher than last check 1 year ago, but I would not change medications at this time     PSA is normal.     Resulted Orders   Basic metabolic panel   Result Value Ref Range    Sodium 136 133 - 144 mmol/L    Potassium 4.2 3.4 - 5.3 mmol/L    Chloride 105 94 - 109 mmol/L    Carbon Dioxide 28 20 - 32 mmol/L    Anion Gap 3 3 - 14 mmol/L    Glucose 108 (H) 70 - 99 mg/dL      Comment:      Fasting specimen    Urea Nitrogen 17 7 - 30 mg/dL    Creatinine 0.90 0.66 - 1.25 mg/dL    GFR Estimate >90 >60 mL/min/[1.73_m2]      Comment:      Non  GFR Calc  Starting 12/18/2018, serum creatinine based estimated GFR (eGFR) will be   calculated using the Chronic Kidney Disease Epidemiology Collaboration   (CKD-EPI) equation.      GFR Estimate If Black >90 >60 mL/min/[1.73_m2]      Comment:       GFR Calc  Starting 12/18/2018, serum creatinine based estimated GFR (eGFR) will be   calculated using the Chronic Kidney Disease Epidemiology Collaboration   (CKD-EPI) equation.      Calcium 9.0 8.5 - 10.1 mg/dL   Lipid panel reflex to direct LDL Fasting   Result Value Ref Range    Cholesterol 190 <200 mg/dL    Triglycerides 80 <150 mg/dL      Comment:      Fasting specimen    HDL Cholesterol 71 >39 mg/dL    LDL Cholesterol Calculated 103 (H) <100 mg/dL      Comment:      Above desirable:  100-129 mg/dl  Borderline High:  130-159 mg/dL  High:             160-189 mg/dL  Very high:       >189 mg/dl      Non HDL Cholesterol 119 <130 mg/dL   **Prostate spec antigen screen FUTURE anytime   Result Value Ref Range    PSA 1.11 0 - 4 ug/L      Comment:      Assay Method:  Chemiluminescence using  Siemens Vista analyzer   ALT   Result Value Ref Range    ALT 45 0 - 70 U/L       If you have any questions or concerns, please call the clinic at the number listed above.       Sincerely,        Leatha Holliday, DO

## 2020-05-28 NOTE — RESULT ENCOUNTER NOTE
Kidney and liver function, electrolytes are normal.    Fasting blood sugar is mildly elevated, similar to previous values seen over the last 3 years    Cholesterol slightly higher than last check 1 year ago, but I would not change medications at this time    PSA is normal.

## 2021-03-08 ENCOUNTER — HOSPITAL ENCOUNTER (EMERGENCY)
Facility: CLINIC | Age: 61
Discharge: HOME | End: 2021-03-08
Attending: EMERGENCY MEDICINE | Admitting: EMERGENCY MEDICINE
Payer: COMMERCIAL

## 2021-03-08 ENCOUNTER — APPOINTMENT (OUTPATIENT)
Dept: CT IMAGING | Facility: CLINIC | Age: 61
End: 2021-03-08
Payer: COMMERCIAL

## 2021-03-08 VITALS
OXYGEN SATURATION: 97 % | TEMPERATURE: 96.8 F | DIASTOLIC BLOOD PRESSURE: 80 MMHG | HEIGHT: 72 IN | BODY MASS INDEX: 23.03 KG/M2 | RESPIRATION RATE: 13 BRPM | WEIGHT: 170 LBS | HEART RATE: 77 BPM | SYSTOLIC BLOOD PRESSURE: 129 MMHG

## 2021-03-08 DIAGNOSIS — E86.1 HYPOVOLEMIA: ICD-10-CM

## 2021-03-08 DIAGNOSIS — R55 SYNCOPE, UNSPECIFIED SYNCOPE TYPE: ICD-10-CM

## 2021-03-08 LAB
ALBUMIN SERPL-MCNC: 3.7 G/DL (ref 3.4–5)
ALP SERPL-CCNC: 71 U/L (ref 40–150)
ALT SERPL W P-5'-P-CCNC: 36 U/L (ref 0–70)
ANION GAP SERPL CALCULATED.3IONS-SCNC: 3 MMOL/L (ref 3–14)
AST SERPL W P-5'-P-CCNC: 40 U/L (ref 0–45)
BASOPHILS # BLD AUTO: 0.1 10E9/L (ref 0–0.2)
BASOPHILS NFR BLD AUTO: 0.9 %
BILIRUB SERPL-MCNC: 0.4 MG/DL (ref 0.2–1.3)
BUN SERPL-MCNC: 16 MG/DL (ref 7–30)
CALCIUM SERPL-MCNC: 8.9 MG/DL (ref 8.5–10.1)
CHLORIDE SERPL-SCNC: 108 MMOL/L (ref 94–109)
CO2 SERPL-SCNC: 29 MMOL/L (ref 20–32)
CREAT SERPL-MCNC: 0.93 MG/DL (ref 0.66–1.25)
D DIMER PPP FEU-MCNC: 0.7 UG/ML FEU (ref 0–0.5)
DIFFERENTIAL METHOD BLD: ABNORMAL
EOSINOPHIL # BLD AUTO: 0.2 10E9/L (ref 0–0.7)
EOSINOPHIL NFR BLD AUTO: 2.7 %
ERYTHROCYTE [DISTWIDTH] IN BLOOD BY AUTOMATED COUNT: 12.9 % (ref 10–15)
GFR SERPL CREATININE-BSD FRML MDRD: 89 ML/MIN/{1.73_M2}
GLUCOSE SERPL-MCNC: 112 MG/DL (ref 70–99)
HCT VFR BLD AUTO: 48.1 % (ref 40–53)
HGB BLD-MCNC: 15 G/DL (ref 13.3–17.7)
IMM GRANULOCYTES # BLD: 0 10E9/L (ref 0–0.4)
IMM GRANULOCYTES NFR BLD: 0.4 %
LACTATE BLD-SCNC: 1.3 MMOL/L (ref 0.7–2)
LYMPHOCYTES # BLD AUTO: 1.2 10E9/L (ref 0.8–5.3)
LYMPHOCYTES NFR BLD AUTO: 20.6 %
MAGNESIUM SERPL-MCNC: 2 MG/DL (ref 1.6–2.3)
MCH RBC QN AUTO: 29 PG (ref 26.5–33)
MCHC RBC AUTO-ENTMCNC: 31.2 G/DL (ref 31.5–36.5)
MCV RBC AUTO: 93 FL (ref 78–100)
MONOCYTES # BLD AUTO: 0.3 10E9/L (ref 0–1.3)
MONOCYTES NFR BLD AUTO: 6.1 %
NEUTROPHILS # BLD AUTO: 3.9 10E9/L (ref 1.6–8.3)
NEUTROPHILS NFR BLD AUTO: 69.3 %
NRBC # BLD AUTO: 0 10*3/UL
NRBC BLD AUTO-RTO: 0 /100
PLATELET # BLD AUTO: 203 10E9/L (ref 150–450)
POTASSIUM SERPL-SCNC: 4.8 MMOL/L (ref 3.4–5.3)
PROT SERPL-MCNC: 6.9 G/DL (ref 6.8–8.8)
RBC # BLD AUTO: 5.17 10E12/L (ref 4.4–5.9)
SODIUM SERPL-SCNC: 140 MMOL/L (ref 133–144)
TROPONIN I SERPL-MCNC: <0.015 UG/L (ref 0–0.04)
WBC # BLD AUTO: 5.6 10E9/L (ref 4–11)

## 2021-03-08 PROCEDURE — 85379 FIBRIN DEGRADATION QUANT: CPT | Performed by: STUDENT IN AN ORGANIZED HEALTH CARE EDUCATION/TRAINING PROGRAM

## 2021-03-08 PROCEDURE — 93010 ELECTROCARDIOGRAM REPORT: CPT | Performed by: EMERGENCY MEDICINE

## 2021-03-08 PROCEDURE — 83735 ASSAY OF MAGNESIUM: CPT | Performed by: STUDENT IN AN ORGANIZED HEALTH CARE EDUCATION/TRAINING PROGRAM

## 2021-03-08 PROCEDURE — 85025 COMPLETE CBC W/AUTO DIFF WBC: CPT | Performed by: STUDENT IN AN ORGANIZED HEALTH CARE EDUCATION/TRAINING PROGRAM

## 2021-03-08 PROCEDURE — 83605 ASSAY OF LACTIC ACID: CPT | Performed by: STUDENT IN AN ORGANIZED HEALTH CARE EDUCATION/TRAINING PROGRAM

## 2021-03-08 PROCEDURE — 99285 EMERGENCY DEPT VISIT HI MDM: CPT | Mod: 25 | Performed by: EMERGENCY MEDICINE

## 2021-03-08 PROCEDURE — 93005 ELECTROCARDIOGRAM TRACING: CPT | Performed by: EMERGENCY MEDICINE

## 2021-03-08 PROCEDURE — 250N000009 HC RX 250: Performed by: EMERGENCY MEDICINE

## 2021-03-08 PROCEDURE — 96360 HYDRATION IV INFUSION INIT: CPT | Mod: 59 | Performed by: EMERGENCY MEDICINE

## 2021-03-08 PROCEDURE — 84484 ASSAY OF TROPONIN QUANT: CPT | Performed by: STUDENT IN AN ORGANIZED HEALTH CARE EDUCATION/TRAINING PROGRAM

## 2021-03-08 PROCEDURE — 258N000003 HC RX IP 258 OP 636: Performed by: STUDENT IN AN ORGANIZED HEALTH CARE EDUCATION/TRAINING PROGRAM

## 2021-03-08 PROCEDURE — 250N000011 HC RX IP 250 OP 636: Performed by: EMERGENCY MEDICINE

## 2021-03-08 PROCEDURE — 80053 COMPREHEN METABOLIC PANEL: CPT | Performed by: STUDENT IN AN ORGANIZED HEALTH CARE EDUCATION/TRAINING PROGRAM

## 2021-03-08 PROCEDURE — 71275 CT ANGIOGRAPHY CHEST: CPT

## 2021-03-08 PROCEDURE — 96361 HYDRATE IV INFUSION ADD-ON: CPT | Performed by: EMERGENCY MEDICINE

## 2021-03-08 PROCEDURE — 93308 TTE F-UP OR LMTD: CPT | Performed by: EMERGENCY MEDICINE

## 2021-03-08 RX ORDER — IOPAMIDOL 755 MG/ML
75 INJECTION, SOLUTION INTRAVASCULAR ONCE
Status: COMPLETED | OUTPATIENT
Start: 2021-03-08 | End: 2021-03-08

## 2021-03-08 RX ADMIN — SODIUM CHLORIDE 100 ML: 9 INJECTION, SOLUTION INTRAVENOUS at 12:00

## 2021-03-08 RX ADMIN — IOPAMIDOL 75 ML: 755 INJECTION, SOLUTION INTRAVENOUS at 12:00

## 2021-03-08 RX ADMIN — SODIUM CHLORIDE 1000 ML: 9 INJECTION, SOLUTION INTRAVENOUS at 10:54

## 2021-03-08 ASSESSMENT — MIFFLIN-ST. JEOR: SCORE: 1624.11

## 2021-03-08 NOTE — ED NOTES
In to discharge pt, pt states has question of what CT scan showed. Provider updated, in to talk to pt.

## 2021-03-08 NOTE — ED NOTES
Discharge info reviewed with pt states understanding. Pt states is feeling much better after IVF, no dizziness or lightheaded when sitting up or standing.

## 2021-03-08 NOTE — ED PROVIDER NOTES
History   Chief complaint: Fainting    HPI    Justin Rivas is a 61 year old male with past medical history of hyperlipidemia, PVCs, ulcerative proctitis/BPH, palpitations and CAD who presents with LOC.  Patient was in working today required a lift.  As he was descending from the lift to get off, he had lost consciousness.  Bystanders stated that he had 2 loss of consciousness episodes today, and has some mild abrasions of the knees and anterior shins.  He had some lightheadedness right before the episode, with stated that when he awoke he thought he was at home.  He states he had drink 3 to 4 cups of coffee, did not hydrate well today, and has a history of poor hydration.  Did not take his metoprolol yesterday, he has intermittent compliance with his metoprolol.  He decided to take it today.  He had no prodromal symptoms of chest pain, shortness of breath, back pain, abdominal pain, headache, or focal weakness.  He has been diagnosed with PVCs by his cardiologist which is why he takes metoprolol.  He denies any history of blood clots or pulmonary embolism.    Allergies:  Allergies   Allergen Reactions     Flomax [Tamsulosin] Other (See Comments)     Lightheadedness     Penicillins Other (See Comments)     Leg became numb after taking PCN as child       Problem List:    Patient Active Problem List    Diagnosis Date Noted     Coronary artery disease involving native coronary artery of native heart without angina pectoris 03/22/2017     Priority: Medium     PVC's (premature ventricular contractions) 03/16/2016     Priority: Medium     On beta blocker, follows with cardiology.       Mixed hyperlipidemia 05/15/2013     Priority: Medium     On statin       Lentigo 03/20/2012     Priority: Medium     Angioma 03/20/2012     Priority: Medium     Seborrheic keratosis 03/20/2012     Priority: Medium     (Problem list name updated by automated process. Provider to review and confirm.)       Left elbow pain 02/24/2012      "Priority: Medium     Advanced directives, counseling/discussion 02/23/2011     Priority: Medium     Patient does not have an Advance/Health Care Directive (HCD), given \"What is Advance Care Planning?\" flyer.    Charlee Rogers  February 23, 2011         Varicose veins      Priority: Medium     Ulcerative proctitis (H)      Priority: Medium     Diagnosis on colonoscopy [facility] 4/2019. Uses mesalamine suppository prn       Family history of premature coronary heart disease      Priority: Medium     Palpitations      Priority: Medium     Benign non-nodular prostatic hyperplasia with lower urinary tract symptoms 10/31/2008     Priority: Medium        Past Medical History:    Past Medical History:   Diagnosis Date     BPH (benign prostatic hyperplasia)      Coronary artery disease      Family history of premature coronary heart disease      Hyperlipidemia      Hyperlipidemia LDL goal <160 5/15/2013     Other premature beats      Palpitations      Skin cancer      Ulcerative proctitis (H)      Varicose veins        Past Surgical History:    Past Surgical History:   Procedure Laterality Date     ABLATE VEIN VARICOSE RADIO FREQUENCY WITHOUT PHLEBECTOMY MULTIPLE STAB  1/24/2013    Procedure: ABLATE VEIN VARICOSE RADIO FREQUENCY WITHOUT PHLEBECTOMY MULTIPLE STAB;  Left leg ablation -vein mapping at 0830;  Surgeon: Amadeo Porter MD;  Location: [facility]     COLONOSCOPY  3/30/2011    COLONOSCOPY performed by ESHA KNOWLES at [facility]       Family History:    Family History   Problem Relation Age of Onset     Hypertension Mother      C.A.D. Father      Circulatory Father         blood disorder     Cerebrovascular Disease Maternal Grandmother      Cerebrovascular Disease Maternal Grandfather      Alzheimer Disease Paternal Grandmother      C.A.D. Paternal Grandmother      C.A.D. Paternal Grandfather      Cancer Brother         lung cancer at age 50     Hypertension Sister      Heart Disease Sister      Multiple Sclerosis " Paternal Uncle        Social History:  Marital Status:  Single [1]  Social History     Tobacco Use     Smoking status: Never Smoker     Smokeless tobacco: Never Used   Substance Use Topics     Alcohol use: Yes     Comment: occasional     Drug use: No        Medications:    acetaminophen (TYLENOL) 325 MG tablet  aspirin EC 81 MG tablet  atorvastatin (LIPITOR) 20 MG tablet  Calcium Carbonate-Vitamin D (CALCIUM + D) 600-200 MG-UNIT per tablet  fish oil-omega-3 fatty acids (FISH OIL) 1000 MG capsule  ibuprofen (ADVIL,MOTRIN) 200 MG tablet  metoprolol succinate ER (TOPROL-XL) 25 MG 24 hr tablet  MULTI VITAMIN MENS OR TABS  Probiotic Product (IQMS PO)  RESTASIS 0.05 % ophthalmic emulsion  SB SALINE NOSE NA  terazosin (HYTRIN) 2 MG capsule          Review of Systems  A 10 point review of systems was conducted and is otherwise negative as stated in the HPI.      Physical Exam      Vitals:    BP: 132/82  Pulse: 61  Temp: 96.8  F (36  C)  Resp: 16  Height: 182.9 cm (6')  Weight: 77.1 kg (170 lb)  SpO2: 99 %  Lying Orthostatic BP: 141/71  Lying Orthostatic Pulse: 72 bpm  Sitting Orthostatic BP: 141/75  Sitting Orthostatic Pulse: 64 bpm  Standing Orthostatic BP: 141/70  Standing Orthostatic Pulse: 81 bpm    Constitutional: awake, NAD  Eyes: No conjunctival injection and normal lids, PERRL, EOMI  - 2 mm equal and reactive  ENT: external nose and ears atraumatic  Neck: Symmetric, trachea midline, Supple  CV: RRR, no murmurs appreciated.  Pulm: Unlabored respiratory effort, good air movement, CTAB, no w/c/r appreciated.  GI: Soft, nontender, nondistended.  No rebound or gaurding.  MSK: No deformities.  No cyanosis.  Neuro: AOx4, normal speech, following commands, grossly symmetric strength in all extremities.  Cranial nerves III-XII grossly intact.   Skin: warm & dry, no rashes or lesions  Psych: Appropriate mood & affect.    Assessments & Plan (with Medical Decision Making)     Evelyn is a 61 year old male with  past medical history of hyperlipidemia, PVCs, ulcerative proctitis/BPH, palpitations and CAD who presents with LOC.  ED Course  ED Course as of Mar 08 1238   Mon Mar 08, 2021   1001 EKG unremarkable for ischemia, compared to previous. Sinus.       1027 BSUS cardiac, adequate LV function, no RV dilation, sliding signs bilaterally, no b-lines, hypovolemia by IVC, 1.0 cm and 0 cm with inspiration.       1036 D-Dimer(!): 0.7   1037 Glucose(!): 112   1037 Potassium: 4.8   1037 Sodium: 140   1037 Calcium: 8.9   1037 Protein Total: 6.9   1039 Troponin I ES: <0.015   1232 CT PE prelim    IMPRESSION:  1.  No pulmonary artery embolism.  2.  No thoracic aortic aneurysm or dissection.   3.  No airspace consolidation or pleural fluid.  4.  Several subcentimeter calcified and noncalcified subpleural  pulmonary nodules, unchanged since 2016 and considered benign. No  follow-up is necessary.          Blood pressure 132/82, temperature of 96.8 Fahrenheit, pulse of 61, respiratory rate 16 saturating 90% on room air.  GCS 15.  Neuro intact, benign abdominal exam.  Speaking full sentences, no crepitus of his anterior or lateral chest walls.  Clear to auscultation bilaterally no wheezing or crackles.  He has gross symmetric strength in the upper and lower extremities.  He has some minor abrasions of his anterior bilateral shins which are new after the fall.  His ankle and knees appeared to have no bony deformities, no effusions, and no joint laxity.  Canadian knee and ankle negative.  His EKG was unremarkable for ischemia, sinus rhythm.  MI and QT TC within normal limits.  Troponin negative, glucose 112.  Creatinine 0.93, sodium 140, potassium 4.8.  Magnesium 2.0.  Hemoglobin 15, white blood cell count 5.6.  Lactic acid 1.3.  D-dimer returned positive at 0.7.  CT PE revealed no signs of PE, infiltrates or dissection.  Is told to follow-up with his primary care doctor, instructions for adequate hydration 6 to 8 glasses of water per day.   Continue current medication regimen.  Symptoms today are likely secondary to hypovolemia, less likely concern for ACS or dysrhythmia.  He was on the monitor for period time during his stay in the emergency department which revealed no ectopy or dysrhythmia.  Strict return precautions, all questions answered.      Plan  - Bedside US cardiac - adequate lv function, no rv dilation, sliding signs bilaterally, no B-lines, IVC 1.0 cm w/ complete collapsibility suggestive of hypovolemia  - s/p 2L NS  - CT PE - unremarkable. No PE or dissection  - Patient was told to follow up with their PCP in 2-3 days.  - Return to the emergency department if you have any worsening or concerning symptoms, or call 911.    Impression  PVCs  Syncope  Lightheadedness  Hypovolemia    Disposition  home    I have reviewed the nursing notes.    I have reviewed the findings, diagnosis, plan and need for follow up with the patient.  Luis Hernández MD  PGY3  Emergency Medicine Resident  3/8/2021   [facility] EMERGENCY DEPT     Daquan Hernández MD  Resident  03/08/21 1239      Carter Toribio MD  03/10/21 1050      ED Attending Physician Attestation   I, Carter Toribio MD evaluated and cared for the patient as part of a shared visit with the Resident. I have performed a history and physical examination of the patient and discussed management with the resident. I reviewed the resident's documentation above and agree with the documented findings and plan of care.  I discussed/reviewed the patient's evaluation, medical decision making and treatment plan. I personally reviewed the vital signs, medications, labs, EKG and imaging.       EKG Interpretation:      Interpreted by Carter Toribio MD  Time reviewed: 10:15 AM  Symptoms at time of EKG: None   Rhythm: Normal sinus   Rate: Normal  Axis: Normal  Ectopy: None  Conduction: Normal  ST Segments/ T Waves: No ST-T wave changes. No acute ischemic changes  Q Waves: None  Comparison to prior:  Unchanged from 12/27/19  Clinical Impression: normal EKG    Summary of HPI, PE, ED Course  My key history or physical exam findings: Syncope with premonition of oncoming recurrent fainting prior to brief LOC with minor leg abrasions from falling to floor of the lift from standing position. He tried to get up after regaining consciousness and briefly fainted again. No preceding symptoms of ACS, palpitations and no other symptoms to suggest seizure, TIA or CVA. No other signs or symptoms to suggest PE/DVT. He reports prior similar fainting with benign outcome which appear to be vasovagal in etio (eg. getting up from bed quickly to use the bathroom). I reviewed the EMR and multiple prior cardiac monitoring studies for prior symptoms of lightheadness and rapid palpitations, ? syncopal episodes dating back to 2007 (2007, 20010, 2012, 2014 x 2) which showed no concerning findings.  I reviewed his most recent Cardiology clinic eval 3/11/20:  He has PVCs dating back to about 2007. He has been on metoprolol for many years. He has occasional symptoms which could be from his PVCs. He would like to try weaning off metoprolol, this seems reasonable.  If he had more palpitations, he will go back on it.  VSS with NL exam except for superficial anterior lower leg abrasions.    ED course notable for:   Orthostatics were negative, but Point of Care bedside Cardiac Echo by the resident physician with images reviewed by me showed evidence of hypovolemia which probably contributed to vasovagal syncope. He was given an IVF bolus and monitored in the ED with no dysrhythmia or concerning ectopy. Doubt atypical ACS, malignant or ventricular dysrhythmia, PE/DVT, seizure, TIA or CVA or other emergent disease process. He was given syncope precautions and recommended to f/u with his PMD and Cardiologist ASAP.    Critical Care & Procedures: Resident performed a bedside US, I was not present when he performed this but reviewed the  images.    Medical Decision Making  Key management decisions made by me: labs and imaging eval, EKG reviewed.  Medical Decision Making The medical record was reviewed and interpreted.  Current labs reviewed and interpreted.  Current images reviewed and interpreted: CT and bedside US performed by the resident.  EKG reviewed and interpreted: as above, NL EKG.    Disposition  After the completion of care in the emergency department, the patient was discharged.    I agree with the PA's evaluation, assessment and treatment plan.  Face to face time with the patient = 20 minutes.    Carter Toribio MD  Date of Service (when I saw the patient): 03/08/21      Carter Toribio MD  03/10/21 1050           Carter Toribio MD  03/13/21 1000

## 2021-03-08 NOTE — DISCHARGE INSTRUCTIONS
Your EKG and cardiac enzymes do not suggest heart attack today or any sign fo cardiac stress. Your scan of your chest did not reveal any dangerous signs (for example, no blood clots).  Follow up with your  primary care doctor in 2-3 days.    Drink 6-8 cups of water per day.     If you develop any concerning symptoms return to the emergency department or call 911 immediately.

## 2021-07-01 DIAGNOSIS — N40.1 BENIGN NON-NODULAR PROSTATIC HYPERPLASIA WITH LOWER URINARY TRACT SYMPTOMS: ICD-10-CM

## 2021-07-05 NOTE — TELEPHONE ENCOUNTER
Routing refill request to provider for review/approval because:  Patient needs to be seen because it has been more than 1 year since last office visit.    Adriel Delgado RN

## 2021-07-06 RX ORDER — TERAZOSIN 2 MG/1
CAPSULE ORAL
Qty: 60 CAPSULE | Refills: 0 | Status: SHIPPED | OUTPATIENT
Start: 2021-07-06 | End: 2021-07-16

## 2021-07-07 NOTE — TELEPHONE ENCOUNTER
I left a message for the patient to return my call.  CSS please deliver message below.    Farnaz SON RN, BSN

## 2021-07-13 DIAGNOSIS — N40.1 BENIGN NON-NODULAR PROSTATIC HYPERPLASIA WITH LOWER URINARY TRACT SYMPTOMS: ICD-10-CM

## 2021-07-15 RX ORDER — TERAZOSIN 2 MG/1
CAPSULE ORAL
Qty: 60 CAPSULE | Refills: 0 | OUTPATIENT
Start: 2021-07-15

## 2021-07-15 NOTE — PROGRESS NOTES
3  SUBJECTIVE:   CC: Justin Rivas is an 61 year old male who presents for preventive health visit.       Patient has been advised of split billing requirements and indicates understanding: Yes       Cov-19 vaccine: Declined; feels it was rushed.  We discussed and he still declines for now  Shingles: Check insurance; plans to get at retail pharmacy  Tdap: Declined    FYI: wonder if can stop taking metoprolol      Referral for Cardiologist    Hyperlipidemia Follow-Up      Are you regularly taking any medication or supplement to lower your cholesterol?   Yes- Atorvastatin 20 mg    Are you having muscle aches or other side effects that you think could be caused by your cholesterol lowering medication?  Yes- mornings daily    Vascular Disease Follow-up      How often do you take nitroglycerin? Not on the med list    Do you take an aspirin every day? Yes     He forget to take metoprolol and thinks that the palpitations actually improved.    He has asymptomatic CAD - mild-moderate stenosis of LAD on coronary CTA    Medication Followup of terazosin (HYTRIN) 2 MG capsule    Taking Medication as prescribed: yes    Side Effects:  None    Medication Helping Symptoms:  Yes    Still getting up 2-3 x night.          Healthy Habits:    Do you get at least three servings of calcium containing foods daily (dairy, green leafy vegetables, etc.)? yes    Amount of exercise or daily activities, outside of work: 3 to 4 day(s) per week    Problems taking medications regularly No    Medication side effects: No    Have you had an eye exam in the past two years? yes    Do you see a dentist twice per year? No - usually do but because cov-19 had stop but will go back     Do you have sleep apnea, excessive snoring or daytime drowsiness?no          Today's PHQ-2 Score: 0  PHQ-2 ( 1999 Pfizer) 7/16/2021 5/22/2019   Q1: Little interest or pleasure in doing things 0 0   Q2: Feeling down, depressed or hopeless 0 0   PHQ-2 Score 0 0       Abuse:  Current or Past(Physical, Sexual or Emotional)- No  Do you feel safe in your environment? Yes        Social History     Tobacco Use     Smoking status: Never Smoker     Smokeless tobacco: Never Used   Substance Use Topics     Alcohol use: Yes     Comment: occasional     If you drink alcohol do you typically have >3 drinks per day or >7 drinks per week? No                      Last PSA:   PSA   Date Value Ref Range Status   05/28/2020 1.11 0 - 4 ug/L Final     Comment:     Assay Method:  Chemiluminescence using Siemens Vista analyzer       Reviewed orders with patient. Reviewed health maintenance and updated orders accordingly - Yes  Current Outpatient Medications   Medication Sig Dispense Refill     aspirin EC 81 MG tablet Take 1 tablet (81 mg) by mouth daily       atorvastatin (LIPITOR) 20 MG tablet Take 1 tablet (20 mg) by mouth At Bedtime 90 tablet 3     Calcium Carbonate-Vitamin D (CALCIUM + D) 600-200 MG-UNIT per tablet Take 1 tablet by mouth daily. 100 tablet 12     metoprolol succinate ER (TOPROL-XL) 25 MG 24 hr tablet Take 1 tablet (25 mg) by mouth daily 90 tablet 3     MULTI VITAMIN MENS OR TABS Take 1 tablet by mouth daily   0     RESTASIS 0.05 % ophthalmic emulsion INSTILL 1 DROP IN BOTH EYES TWICE A DAY (Patient taking differently: Place 1 drop into both eyes 2 times daily ) 180 each 3     SB SALINE NOSE NA Spray 2 sprays into both nostrils 2 times daily per Dr Arce.       terazosin (HYTRIN) 2 MG capsule TAKE 1 CAPSULE BY MOUTH TWICE DAILY 60 capsule 0     acetaminophen (TYLENOL) 325 MG tablet Take 2 tablets by mouth every 6 hours as needed. (Patient not taking: Reported on 7/16/2021)       fish oil-omega-3 fatty acids (FISH OIL) 1000 MG capsule Take 1 g by mouth daily  (Patient not taking: Reported on 7/16/2021)       ibuprofen (ADVIL,MOTRIN) 200 MG tablet Take 400 mg by mouth every 4 hours as needed. (Patient not taking: Reported on 7/16/2021)       Probiotic Product (Citrus PO)  "Take 1 capsule by mouth daily as needed 1 capsule daily  (Patient not taking: Reported on 7/16/2021)         Reviewed and updated as needed this visit by clinical staff  Tobacco  Allergies  Meds  Problems  Med Hx  Surg Hx  Fam Hx  Soc Hx          Reviewed and updated as needed this visit by Provider     Problems                ROS: negative except as noted in HPI  CONSTITUTIONAL: NEGATIVE for fever, chills, change in weight  INTEGUMENTARY/SKIN: NEGATIVE for worrisome rashes, moles or lesions  EYES: NEGATIVE for vision changes or irritation  ENT: NEGATIVE for ear, mouth and throat problems  RESP: NEGATIVE for significant cough or SOB  CV: NEGATIVE for chest pain, palpitations or peripheral edema  GI: NEGATIVE for nausea, abdominal pain, heartburn, or change in bowel habits   male: negative for dysuria, hematuria, decreased urinary stream, erectile dysfunction, urethral discharge  MUSCULOSKELETAL: NEGATIVE for significant arthralgias or myalgia  NEURO: NEGATIVE for weakness, dizziness or paresthesias  PSYCHIATRIC: NEGATIVE for changes in mood or affect    OBJECTIVE:   /62   Pulse 68   Temp 98.2  F (36.8  C) (Tympanic)   Resp 16   Ht 1.842 m (6' 0.5\")   Wt 81.2 kg (179 lb)   SpO2 98%   BMI 23.94 kg/m    EXAM:  GENERAL: healthy, alert and no distress  EYES: Eyes grossly normal to inspection, PERRL and conjunctivae and sclerae normal  HENT: ear canals and TM's normal, nose and mouth without ulcers or lesions  NECK: no adenopathy, no asymmetry, masses, or scars and thyroid normal to palpation  RESP: lungs clear to auscultation - no rales, rhonchi or wheezes  CV: regular rate and rhythm, normal S1 S2, no S3 or S4, no murmur, click or rub, no peripheral edema and peripheral pulses strong  ABDOMEN: soft, nontender, no hepatosplenomegaly, no masses and bowel sounds normal  MS: no gross musculoskeletal defects noted, no edema  SKIN: no suspicious lesions or rashes  NEURO: Normal strength and tone, " "mentation intact and speech normal  PSYCH: mentation appears normal, affect normal/bright        ASSESSMENT/PLAN:   1. Routine general medical examination at a health care facility    2. Mixed hyperlipidemia  - stable, refill provided  - atorvastatin (LIPITOR) 20 MG tablet; Take 1 tablet (20 mg) by mouth At Bedtime  Dispense: 90 tablet; Refill: 3  - Lipid panel reflex to direct LDL Fasting; Future  - OFFICE/OUTPT VISIT,EST,LEVL IV    3. PVC's (premature ventricular contractions) -patient prefers to stop metoprolol which is reasonable.  - Basic metabolic panel  (Ca, Cl, CO2, Creat, Gluc, K, Na, BUN); Future  - OFFICE/OUTPT VISIT,EST,LEVL IV    4. Benign non-nodular prostatic hyperplasia with lower urinary tract symptoms  -uncontrolled symptoms.  Increase from 2 twice daily to 5 twice daily.  Patient is considering following up with urology  - Basic metabolic panel  (Ca, Cl, CO2, Creat, Gluc, K, Na, BUN); Future  - Adult Urology Referral; Future  - terazosin (HYTRIN) 5 MG capsule; Take 1 capsule (5 mg) by mouth 2 times daily  Dispense: 180 capsule; Refill: 3  - OFFICE/OUTPT VISIT,EST,LEVL IV    5. Coronary artery disease involving native coronary artery of native heart without angina pectoris -needs updated CTA and follow-up with cardiology      Patient has been advised of split billing requirements and indicates understanding: Yes  COUNSELING:  Reviewed preventive health counseling, as reflected in patient instructions    Estimated body mass index is 23.94 kg/m  as calculated from the following:    Height as of this encounter: 1.842 m (6' 0.5\").    Weight as of this encounter: 81.2 kg (179 lb).        He reports that he has never smoked. He has never used smokeless tobacco.      Counseling Resources:  ATP IV Guidelines  Pooled Cohorts Equation Calculator  FRAX Risk Assessment  ICSI Preventive Guidelines  Dietary Guidelines for Americans, 2010  USDA's MyPlate  ASA Prophylaxis  Lung CA Screening    Leatha Holliday, "   [facility]

## 2021-07-15 NOTE — PATIENT INSTRUCTIONS
Preventive Health Recommendations  Male Ages 50 - 64    Yearly exam:             See your health care provider every year in order to  o   Review health changes.   o   Discuss preventive care.    o   Review your medicines if your doctor has prescribed any.     Have a cholesterol test every 5 years, or more frequently if you are at risk for high cholesterol/heart disease.     Have a diabetes test (fasting glucose) every three years. If you are at risk for diabetes, you should have this test more often.     Have a colonoscopy at age 50, or have a yearly FIT test (stool test). These exams will check for colon cancer.      Talk with your health care provider about whether or not a prostate cancer screening test (PSA) is right for you.    You should be tested each year for STDs (sexually transmitted diseases), if you re at risk.     Shots: Get a flu shot each year. Get a tetanus shot every 10 years.     Nutrition:    Eat at least 5 servings of fruits and vegetables daily.     Eat whole-grain bread, whole-wheat pasta and brown rice instead of white grains and rice.     Get adequate Calcium and Vitamin D.     Lifestyle    Exercise for at least 150 minutes a week (30 minutes a day, 5 days a week). This will help you control your weight and prevent disease.     Limit alcohol to one drink per day.     No smoking.     Wear sunscreen to prevent skin cancer.     See your dentist every six months for an exam and cleaning.     See your eye doctor every 1 to 2 years.      Heart:  1. Dr. Chandra said you can go off the metoprolol.  If the palpitations get worse, then would probably recommend to go back on it  2. Dr. Chandra recommended updated CT scan.  Radiology test was ordered.  Please call [phone] to schedule.  3. Follow-up with DR. Chandra after the CT scan.  4. Blood work today    Enlarged Prostate:  1. Increase Terazosin to 5 mg twice daily

## 2021-07-16 ENCOUNTER — OFFICE VISIT (OUTPATIENT)
Dept: FAMILY MEDICINE | Facility: CLINIC | Age: 61
End: 2021-07-16
Payer: COMMERCIAL

## 2021-07-16 VITALS
SYSTOLIC BLOOD PRESSURE: 130 MMHG | TEMPERATURE: 98.2 F | HEIGHT: 73 IN | WEIGHT: 179 LBS | OXYGEN SATURATION: 98 % | HEART RATE: 68 BPM | RESPIRATION RATE: 16 BRPM | BODY MASS INDEX: 23.72 KG/M2 | DIASTOLIC BLOOD PRESSURE: 62 MMHG

## 2021-07-16 DIAGNOSIS — N40.1 BENIGN NON-NODULAR PROSTATIC HYPERPLASIA WITH LOWER URINARY TRACT SYMPTOMS: ICD-10-CM

## 2021-07-16 DIAGNOSIS — Z00.00 ROUTINE GENERAL MEDICAL EXAMINATION AT A HEALTH CARE FACILITY: Primary | ICD-10-CM

## 2021-07-16 DIAGNOSIS — I25.10 CORONARY ARTERY DISEASE INVOLVING NATIVE CORONARY ARTERY OF NATIVE HEART WITHOUT ANGINA PECTORIS: ICD-10-CM

## 2021-07-16 DIAGNOSIS — E78.2 MIXED HYPERLIPIDEMIA: ICD-10-CM

## 2021-07-16 DIAGNOSIS — I49.3 PVC'S (PREMATURE VENTRICULAR CONTRACTIONS): ICD-10-CM

## 2021-07-16 LAB
ANION GAP SERPL CALCULATED.3IONS-SCNC: 1 MMOL/L (ref 3–14)
BUN SERPL-MCNC: 19 MG/DL (ref 7–30)
CALCIUM SERPL-MCNC: 8.6 MG/DL (ref 8.5–10.1)
CHLORIDE BLD-SCNC: 107 MMOL/L (ref 94–109)
CHOLEST SERPL-MCNC: 234 MG/DL
CO2 SERPL-SCNC: 31 MMOL/L (ref 20–32)
CREAT SERPL-MCNC: 0.91 MG/DL (ref 0.66–1.25)
FASTING STATUS PATIENT QL REPORTED: YES
GFR SERPL CREATININE-BSD FRML MDRD: >90 ML/MIN/1.73M2
GLUCOSE BLD-MCNC: 97 MG/DL (ref 70–99)
HDLC SERPL-MCNC: 66 MG/DL
LDLC SERPL CALC-MCNC: 152 MG/DL
NONHDLC SERPL-MCNC: 168 MG/DL
POTASSIUM BLD-SCNC: 4.2 MMOL/L (ref 3.4–5.3)
SODIUM SERPL-SCNC: 139 MMOL/L (ref 133–144)
TRIGL SERPL-MCNC: 82 MG/DL

## 2021-07-16 PROCEDURE — 80061 LIPID PANEL: CPT | Performed by: INTERNAL MEDICINE

## 2021-07-16 PROCEDURE — 80048 BASIC METABOLIC PNL TOTAL CA: CPT | Performed by: INTERNAL MEDICINE

## 2021-07-16 PROCEDURE — 99214 OFFICE O/P EST MOD 30 MIN: CPT | Mod: 25 | Performed by: INTERNAL MEDICINE

## 2021-07-16 PROCEDURE — 36415 COLL VENOUS BLD VENIPUNCTURE: CPT | Performed by: INTERNAL MEDICINE

## 2021-07-16 PROCEDURE — 99396 PREV VISIT EST AGE 40-64: CPT | Performed by: INTERNAL MEDICINE

## 2021-07-16 RX ORDER — ATORVASTATIN CALCIUM 40 MG/1
40 TABLET, FILM COATED ORAL AT BEDTIME
Qty: 90 TABLET | Refills: 3 | Status: SHIPPED | OUTPATIENT
Start: 2021-07-16 | End: 2022-09-12

## 2021-07-16 RX ORDER — TERAZOSIN 5 MG/1
5 CAPSULE ORAL 2 TIMES DAILY
Qty: 180 CAPSULE | Refills: 3 | Status: SHIPPED | OUTPATIENT
Start: 2021-07-16 | End: 2022-09-21

## 2021-07-16 RX ORDER — ATORVASTATIN CALCIUM 20 MG/1
20 TABLET, FILM COATED ORAL AT BEDTIME
Qty: 90 TABLET | Refills: 3 | Status: SHIPPED | OUTPATIENT
Start: 2021-07-16 | End: 2021-07-16

## 2021-07-16 ASSESSMENT — MIFFLIN-ST. JEOR: SCORE: 1672.88

## 2021-07-16 NOTE — RESULT ENCOUNTER NOTE
Cholesterol is higher from 1 year ago.  Recommend to increase the atorvastatin to 40 mg once a day and recheck the fasting cholesterol in 2 months.  Kidney function, electrolytes, blood sugar is normal

## 2021-07-21 ENCOUNTER — TELEPHONE (OUTPATIENT)
Dept: FAMILY MEDICINE | Facility: CLINIC | Age: 61
End: 2021-07-21

## 2021-07-21 DIAGNOSIS — I49.3 PVC'S (PREMATURE VENTRICULAR CONTRACTIONS): Primary | ICD-10-CM

## 2021-07-21 RX ORDER — METOPROLOL SUCCINATE 25 MG/1
25 TABLET, EXTENDED RELEASE ORAL DAILY
Qty: 90 TABLET | Refills: 1 | Status: SHIPPED | OUTPATIENT
Start: 2021-07-21 | End: 2023-02-16

## 2021-07-21 NOTE — TELEPHONE ENCOUNTER
Provider covering for Dr. Holliday,    S-(situation): palpitations    B-(background): stopped metoprolol 4 days ago.  Started up with  Palpitations.  Denies SOA, CP, diaphoresis, pain that travels to his jaw, back arm, or shoulder.  He was dizzy but it passed.  Has a home  bp but doesn't use it.  Says his bp and P were good when he just saw Dr. Holliday.  Has Metoprolol 25 mg tabs left.  Per patient he is going to start back up with this.      A-(assessment): palpitations    R-(recommendations): advised ER if symptomatic with palpitations, SOA, Pain in chest, arms, shoulders, neck or back.  Dizziness nausea or vomiting.  Advised to check his bp and P.  Should not have pulse lower than 60 or over 100.  Lexi ANGEL RN

## 2021-07-21 NOTE — TELEPHONE ENCOUNTER
Reason for Call:  Medication or medication refill:    Do you use a [facility]?  Name of the pharmacy and phone number for the current request:  [facility] - [place] [phone]    Name of the medication requested: Metroprolol    Other request: Patient was taken off Metroprolol by Dr. Holliday, but heart palpitations did get worse, patient would like Dr. Holliday not to cancel rx at [facility]    Can we leave a detailed message on this number? YES    Phone number patient can be reached at: Home number on file [phone] (home)    Best Time: Any    Call taken on 7/21/2021 at 11:30 AM by Leticia Peres

## 2022-08-13 NOTE — DISCHARGE INSTRUCTIONS
ICD-10-CM    1. Syncope, unspecified syncope type R55     I suspect this was vasovagal syncope related to the painful crush injury of fingers.  No serious causes otherwise found   2. Crushed finger, distal, initial encounter S67.10XA     no signs of fracture. not enough blood collected to trephinate the nail, but may be needed        Statement Selected

## 2022-09-09 DIAGNOSIS — E78.2 MIXED HYPERLIPIDEMIA: ICD-10-CM

## 2022-09-12 RX ORDER — ATORVASTATIN CALCIUM 40 MG/1
40 TABLET, FILM COATED ORAL DAILY
Qty: 90 TABLET | Refills: 0 | Status: SHIPPED | OUTPATIENT
Start: 2022-09-12 | End: 2022-12-30

## 2022-09-15 NOTE — PATIENT INSTRUCTIONS
Thank you for choosing [facility].  You may be receiving a survey in the mail from [employer] Rain[employer] regarding your visit today.  Please take a few minutes to complete and return the survey to let us know how we are doing.      If you have questions or concerns, please contact us via [employer] or you can contact your care team at [phone].    Our Clinic hours are:  Monday 6:40 am  to 7:00 pm  Tuesday -Friday 6:40 am to 5:00 pm    The [place] outpatient lab hours are:  Monday - Friday 6:10 am to 4:45 pm  Saturdays 7:00 am to 11:00 am  Appointments are required, call [phone]    If you have clinical questions after hours or would like to schedule an appointment,  call the clinic at [phone].    1. Change from 1/2 of 50 mg pill to whole of 25 mg pill twice daily.  2. Come back for fasting blood work.  3. Return to clinic 1 year, sooner if needed.  4. Get colonoscopy  5. Check with insurance about shingles vaccine.  You do not need pneumonia vaccine until age 65  6. If the shoulder gets worse, call in for referral to sports medicine        Bursitis  You have bursitis. This is an inflammation of the bursa. These are small, fluid-filled sacs that surround the larger joints of the body. The bursa help the muscles and tendons move smoothly over the joints.  Bursitis often happens in the shoulder. But it can also affect the elbows, hips, pelvis, knees, toes, and heels. Bursitis can be caused by injury, overuse of the joint, or infection of the bursa. Symptoms include pain and tenderness over a joint. Symptoms get worse with movement.  Bursitis is treated with an anti-inflammatory medicine and by resting the joint. More severe cases require injection of medicine directly into the bursa.    Home care    Rest the painful joint and protect it from movement. This will allow the inflammation to heal faster.    Apply an ice pack over the injured area for no more than 15 to 20 minutes. Do this every 3 to 6 hours for  the first 24 to 48 hours. Keep using ice packs 3 to 4 times a day until the pain and swelling improves.     To make an ice pack, put ice cubes in a sealed plastic zip-lock bag. Wrap the bag in a clean, thin towel or cloth. Never put ice or an ice pack directly on the skin. As the ice melts, be careful to avoid getting any wrap or splint wet.    You may take over-the-counter pain medicine to treat pain and inflammation, unless another medicine was prescribed. Anti-inflammatory pain medicines may be more effective. Talk with your provider beforeusing these medicines if you have chronic liver or kidney disease, or ever had a stomach ulcer or GI (gastrointestinal) bleeding.    As your symptoms improve, slowly begin to move the joint. Do not overuse the joint. This may cause the symptoms to flare up again.  When to seek medical advice  Call your healthcare provider right away if any of these occur:    Redness over the painful area    Increasing pain or swelling at the joint    Fever of 100.4 F (38 C) or above lasting for 24 to 48 hours    2358-1510 [employer]. [address] [place] [address], [place] [address]. All rights reserved. This information is not intended as a substitute for professional medical care. Always follow your healthcare professional's instructions.         Port Dimensions-Y Axis In Cm: 2.8

## 2022-09-21 DIAGNOSIS — N40.1 BENIGN NON-NODULAR PROSTATIC HYPERPLASIA WITH LOWER URINARY TRACT SYMPTOMS: ICD-10-CM

## 2022-09-21 RX ORDER — TERAZOSIN 5 MG/1
5 CAPSULE ORAL 2 TIMES DAILY
Qty: 180 CAPSULE | Refills: 0 | Status: SHIPPED | OUTPATIENT
Start: 2022-09-21 | End: 2022-12-30

## 2022-09-21 NOTE — TELEPHONE ENCOUNTER
"Requested Prescriptions   Pending Prescriptions Disp Refills    terazosin (HYTRIN) 5 MG capsule [Pharmacy Med Name: TERAZOSIN 5MG CAPSULE] 180 capsule 3     Sig: Take 1 capsule (5 mg) by mouth 2 times daily        Alpha Blockers Failed - 9/21/2022  1:00 AM        Failed - Blood pressure under 140/90 in past 12 months       BP Readings from Last 3 Encounters:   07/16/21 130/62   03/08/21 129/80   03/11/20 131/69                 Failed - Recent (12 mo) or future (30 days) visit within the authorizing provider's specialty     Patient has had an office visit with the authorizing provider or a provider within the authorizing providers department within the previous 12 mos or has a future within next 30 days. See \"Patient Info\" tab in inbasket, or \"Choose Columns\" in Meds & Orders section of the refill encounter.              Passed - Patient does not have Tadalafil, Vardenafil, or Sildenafil on their medication list        Passed - Medication is active on med list        Passed - Patient is 18 years of age or older       Antiadrenergic Antihypertensives Failed - 9/21/2022  1:00 AM        Failed - Blood pressure less than 140/90 in past 6 months       BP Readings from Last 3 Encounters:   07/16/21 130/62   03/08/21 129/80   03/11/20 131/69                 Failed - Normal serum creatinine on file in past 12 months     Recent Labs   Lab Test 07/16/21  0740   CR 0.91       Ok to refill medication if creatinine is low          Failed - Recent (6 mo) or future (30 days) visit within the authorizing provider's specialty     Patient had office visit in the last 6 months or has a visit in the next 30 days with authorizing provider or within the authorizing provider's specialty.  See \"Patient Info\" tab in inbasket, or \"Choose Columns\" in Meds & Orders section of the refill encounter.            Passed - Medication is active on med list        Passed - Patient is age 18 or older              "
PING on patient VM rx approved and sent to pharmacy for 90 days, patient is due for f/u appt.Leticia Peres on 9/21/2022 at 11:54 AM    
Short refill, due for follow-up   
I have personally performed a face to face diagnostic evaluation on this patient. I have reviewed the ACP note and agree with the history, exam and plan of care, except as noted.

## 2022-12-29 DIAGNOSIS — N40.1 BENIGN NON-NODULAR PROSTATIC HYPERPLASIA WITH LOWER URINARY TRACT SYMPTOMS: ICD-10-CM

## 2022-12-29 DIAGNOSIS — E78.2 MIXED HYPERLIPIDEMIA: ICD-10-CM

## 2022-12-30 RX ORDER — TERAZOSIN 5 MG/1
CAPSULE ORAL
Qty: 60 CAPSULE | Refills: 0 | Status: SHIPPED | OUTPATIENT
Start: 2022-12-30 | End: 2023-02-16 | Stop reason: SINTOL

## 2022-12-30 RX ORDER — ATORVASTATIN CALCIUM 40 MG/1
40 TABLET, FILM COATED ORAL DAILY
Qty: 30 TABLET | Refills: 0 | Status: SHIPPED | OUTPATIENT
Start: 2022-12-30 | End: 2023-02-16

## 2022-12-30 NOTE — TELEPHONE ENCOUNTER
"Requested Prescriptions   Pending Prescriptions Disp Refills    atorvastatin (LIPITOR) 40 MG tablet [Pharmacy Med Name: ATORVASTATIN 40MG TABLET] 90 tablet 0     Sig: Take 1 tablet (40 mg) by mouth daily Needs office visit and labs for further refills       Statins Protocol Failed - 12/29/2022  1:09 AM        Failed - LDL on file in past 12 months     Recent Labs   Lab Test 07/16/21  0740   *             Failed - Recent (12 mo) or future (30 days) visit within the authorizing provider's specialty     Patient has had an office visit with the authorizing provider or a provider within the authorizing providers department within the previous 12 mos or has a future within next 30 days. See \"Patient Info\" tab in inbasket, or \"Choose Columns\" in Meds & Orders section of the refill encounter.              Passed - No abnormal creatine kinase in past 12 months     Recent Labs   Lab Test 05/21/15  0905                   Passed - Medication is active on med list        Passed - Patient is age 18 or older          terazosin (HYTRIN) 5 MG capsule [Pharmacy Med Name: TERAZOSIN 5MG CAPSULE] 180 capsule 0     Sig: TAKE 1 CAPSULE (5 MG) BY MOUTH 2 TIMES DAILY DUE FORFOLLOW-UP       Alpha Blockers Failed - 12/29/2022  1:09 AM        Failed - Blood pressure under 140/90 in past 12 months     BP Readings from Last 3 Encounters:   07/16/21 130/62   03/08/21 129/80   03/11/20 131/69                 Failed - Recent (12 mo) or future (30 days) visit within the authorizing provider's specialty     Patient has had an office visit with the authorizing provider or a provider within the authorizing providers department within the previous 12 mos or has a future within next 30 days. See \"Patient Info\" tab in inbasket, or \"Choose Columns\" in Meds & Orders section of the refill encounter.              Passed - Patient does not have Tadalafil, Vardenafil, or Sildenafil on their medication list        Passed - Medication is active on " "med list        Passed - Patient is 18 years of age or older       Antiadrenergic Antihypertensives Failed - 12/29/2022  1:09 AM        Failed - Blood pressure less than 140/90 in past 6 months     BP Readings from Last 3 Encounters:   07/16/21 130/62   03/08/21 129/80   03/11/20 131/69                 Failed - Normal serum creatinine on file in past 12 months     Recent Labs   Lab Test 07/16/21  0740   CR 0.91       Ok to refill medication if creatinine is low          Failed - Recent (6 mo) or future (30 days) visit within the authorizing provider's specialty     Patient had office visit in the last 6 months or has a visit in the next 30 days with authorizing provider or within the authorizing provider's specialty.  See \"Patient Info\" tab in inbasket, or \"Choose Columns\" in Meds & Orders section of the refill encounter.            Passed - Medication is active on med list        Passed - Patient is age 18 or older             "

## 2022-12-30 NOTE — TELEPHONE ENCOUNTER
LM on patient VM last evangelista refill, last visit 7/21, please call The Care Team to make an appointment with Dr. Holliday.Leticia Peres on 12/30/2022 at 2:47 PM

## 2023-02-16 ENCOUNTER — OFFICE VISIT (OUTPATIENT)
Dept: FAMILY MEDICINE | Facility: CLINIC | Age: 63
End: 2023-02-16
Payer: COMMERCIAL

## 2023-02-16 VITALS
HEIGHT: 71 IN | DIASTOLIC BLOOD PRESSURE: 80 MMHG | SYSTOLIC BLOOD PRESSURE: 136 MMHG | TEMPERATURE: 96.1 F | HEART RATE: 61 BPM | WEIGHT: 169.9 LBS | OXYGEN SATURATION: 98 % | BODY MASS INDEX: 23.79 KG/M2

## 2023-02-16 DIAGNOSIS — E78.2 MIXED HYPERLIPIDEMIA: ICD-10-CM

## 2023-02-16 DIAGNOSIS — I49.3 PVC'S (PREMATURE VENTRICULAR CONTRACTIONS): ICD-10-CM

## 2023-02-16 DIAGNOSIS — N40.1 BENIGN NON-NODULAR PROSTATIC HYPERPLASIA WITH LOWER URINARY TRACT SYMPTOMS: ICD-10-CM

## 2023-02-16 DIAGNOSIS — K51.20 ULCERATIVE PROCTITIS WITHOUT COMPLICATION (H): ICD-10-CM

## 2023-02-16 DIAGNOSIS — Z00.00 ROUTINE GENERAL MEDICAL EXAMINATION AT A HEALTH CARE FACILITY: Primary | ICD-10-CM

## 2023-02-16 DIAGNOSIS — I25.10 CORONARY ARTERY DISEASE INVOLVING NATIVE CORONARY ARTERY OF NATIVE HEART WITHOUT ANGINA PECTORIS: ICD-10-CM

## 2023-02-16 DIAGNOSIS — Z23 NEED FOR VACCINATION: ICD-10-CM

## 2023-02-16 PROCEDURE — 99396 PREV VISIT EST AGE 40-64: CPT | Mod: 25 | Performed by: INTERNAL MEDICINE

## 2023-02-16 PROCEDURE — 90471 IMMUNIZATION ADMIN: CPT | Performed by: INTERNAL MEDICINE

## 2023-02-16 PROCEDURE — 90714 TD VACC NO PRESV 7 YRS+ IM: CPT | Performed by: INTERNAL MEDICINE

## 2023-02-16 PROCEDURE — 99214 OFFICE O/P EST MOD 30 MIN: CPT | Mod: 25 | Performed by: INTERNAL MEDICINE

## 2023-02-16 RX ORDER — ATORVASTATIN CALCIUM 40 MG/1
40 TABLET, FILM COATED ORAL DAILY
Qty: 90 TABLET | Refills: 3 | Status: SHIPPED | OUTPATIENT
Start: 2023-02-16

## 2023-02-16 RX ORDER — METOPROLOL SUCCINATE 25 MG/1
25 TABLET, EXTENDED RELEASE ORAL DAILY
Qty: 90 TABLET | Refills: 1 | Status: CANCELLED | OUTPATIENT
Start: 2023-02-16

## 2023-02-16 ASSESSMENT — ENCOUNTER SYMPTOMS
JOINT SWELLING: 0
CONSTIPATION: 0
HEADACHES: 0
CHILLS: 0
ABDOMINAL PAIN: 0
ARTHRALGIAS: 0
FREQUENCY: 1
MYALGIAS: 0
PALPITATIONS: 0
WEAKNESS: 0
HEMATOCHEZIA: 0
NERVOUS/ANXIOUS: 0
SORE THROAT: 0
COUGH: 0
FEVER: 0
HEARTBURN: 0
DIZZINESS: 0
SHORTNESS OF BREATH: 0
EYE PAIN: 0
NAUSEA: 0
PARESTHESIAS: 0
DIARRHEA: 0
HEMATURIA: 0

## 2023-02-16 ASSESSMENT — PAIN SCALES - GENERAL: PAINLEVEL: NO PAIN (0)

## 2023-02-16 NOTE — PROGRESS NOTES
SUBJECTIVE:   CC: Abdirashid is an 62 year old who presents for preventative health visit.   Patient has been advised of split billing requirements and indicates understanding: Yes  Healthy Habits:     Getting at least 3 servings of Calcium per day:  Yes    Bi-annual eye exam:  Yes    Dental care twice a year:  Yes    Sleep apnea or symptoms of sleep apnea:  None    Diet:  Regular (no restrictions)    Frequency of exercise:  2-3 days/week    Duration of exercise:  15-30 minutes    Taking medications regularly:  Yes    Medication side effects:  Lightheadedness    PHQ-2 Total Score: 0    Additional concerns today:  No       Chief Complaint   Patient presents with     Physical     Lipids     Health Maintenance     Due for Covid, shingles TD  TD shot only       Blood Draw     Like would to this last later due to not fasting        Hyperlipidemia Follow-Up      Are you regularly taking any medication or supplement to lower your cholesterol?   Yes- PM    Are you having muscle aches or other side effects that you think could be caused by your cholesterol lowering medication?  No    CAD:  -- Several years since he is seeing cardiologist - has appointment in March  --He has asymptomatic CAD - mild-moderate stenosis of LAD on coronary CTA  -- Metoprolol was stopped due to PVCs.  He opted to stop in July 2021.  --he has only been taking atorvastatin 20 mg; but often forgets to take  --uses NSAID 1-2 x month    BPH:  --flomax caused significant dizziness, so was switched to terazosin;;  In the last few months it also caused dizziness, so he stopped  --mild increase in urinary frequency but is tolerable    Today's PHQ-2 Score:   PHQ-2 ( 1999 Pfizer) 2/16/2023   Q1: Little interest or pleasure in doing things 0   Q2: Feeling down, depressed or hopeless 0   PHQ-2 Score 0   PHQ-2 Total Score (12-17 Years)- Positive if 3 or more points; Administer PHQ-A if positive -   Q1: Little interest or pleasure in doing things Not at all   Q2:  Feeling down, depressed or hopeless Not at all   PHQ-2 Score 0           Social History     Tobacco Use     Smoking status: Never     Smokeless tobacco: Never   Substance Use Topics     Alcohol use: Yes     Comment: occasional     If you drink alcohol do you typically have >3 drinks per day or >7 drinks per week? No    Alcohol Use 2/16/2023   Prescreen: >3 drinks/day or >7 drinks/week? No   Prescreen: >3 drinks/day or >7 drinks/week? -   No flowsheet data found.    Last PSA:   PSA   Date Value Ref Range Status   05/28/2020 1.11 0 - 4 ug/L Final     Comment:     Assay Method:  Chemiluminescence using Siemens Vista analyzer       Reviewed orders with patient. Reviewed health maintenance and updated orders accordingly - Yes  Current Outpatient Medications   Medication Sig Dispense Refill     acetaminophen (TYLENOL) 325 MG tablet Take 2 tablets by mouth every 6 hours as needed       aspirin EC 81 MG tablet Take 1 tablet (81 mg) by mouth daily       atorvastatin (LIPITOR) 40 MG tablet Take 1 tablet (40 mg) by mouth daily 90 tablet 3     Calcium Carbonate-Vitamin D (CALCIUM + D) 600-200 MG-UNIT per tablet Take 1 tablet by mouth daily. 100 tablet 12     ibuprofen (ADVIL,MOTRIN) 200 MG tablet Take 400 mg by mouth every 4 hours as needed       MULTI VITAMIN MENS OR TABS Take 1 tablet by mouth daily   0     Probiotic Product (Touch of Classic PO) Take 1 capsule by mouth daily as needed 1 capsule daily       RESTASIS 0.05 % ophthalmic emulsion INSTILL 1 DROP IN BOTH EYES TWICE A DAY (Patient taking differently: Place 1 drop into both eyes 2 times daily) 180 each 3     SB SALINE NOSE NA Spray 2 sprays into both nostrils as needed per Dr Arce.         Reviewed and updated as needed this visit by clinical staff   Tobacco  Allergies  Meds  Problems             Reviewed and updated as needed this visit by Provider      Problems                Review of Systems   Constitutional: Negative for chills and fever.   HENT:  "Negative for congestion, ear pain, hearing loss and sore throat.    Eyes: Negative for pain and visual disturbance.   Respiratory: Negative for cough and shortness of breath.    Cardiovascular: Negative for chest pain, palpitations and peripheral edema.   Gastrointestinal: Negative for abdominal pain, constipation, diarrhea, heartburn, hematochezia and nausea.   Genitourinary: Positive for frequency. Negative for genital sores, hematuria, impotence, penile discharge and urgency.   Musculoskeletal: Negative for arthralgias, joint swelling and myalgias.   Skin: Negative for rash.   Neurological: Negative for dizziness, weakness, headaches and paresthesias.   Psychiatric/Behavioral: Negative for mood changes. The patient is not nervous/anxious.          OBJECTIVE:   /80 (BP Location: Right arm, Patient Position: Sitting, Cuff Size: Adult Regular)   Pulse 61   Temp (!) 96.1  F (35.6  C) (Tympanic)   Ht 1.81 m (5' 11.26\")   Wt 77.1 kg (169 lb 14.4 oz)   SpO2 98%   BMI 23.52 kg/m      Physical Exam  GENERAL: healthy, alert and no distress  EYES: Eyes grossly normal to inspection, PERRL and conjunctivae and sclerae normal  HENT: ear canals and TM's normal, nose and mouth without ulcers or lesions  NECK: no adenopathy, no asymmetry, masses, or scars and thyroid normal to palpation  RESP: lungs clear to auscultation - no rales, rhonchi or wheezes  CV: frequent extasystoles without compensatory pause, normal S1 S2, no S3 or S4, no murmur, click or rub, peripheral pulses strong and no peripheral edema  ABDOMEN: soft, nontender, no hepatosplenomegaly, no masses and bowel sounds normal  MS: no gross musculoskeletal defects noted, no edema  SKIN: no suspicious lesions or rashes  NEURO: Normal strength and tone, mentation intact and speech normal  PSYCH: mentation appears normal, affect normal/bright        ASSESSMENT/PLAN:   (Z00.00) Routine general medical examination at a health care facility  (primary encounter " diagnosis)  Comment:   Plan:     (E78.2) Mixed hyperlipidemia  Comment: take med regularly and recheck in ~ 2 months  Plan: atorvastatin (LIPITOR) 40 MG tablet, Lipid         panel reflex to direct LDL Fasting, Basic         metabolic panel  (Ca, Cl, CO2, Creat, Gluc, K,         Na, BUN)            (I25.10) Coronary artery disease involving native coronary artery of native heart without angina pectoris  Comment: nonobstructive, no history of stent  Plan:     (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.20) Ulcerative proctitis without complication (H)  Comment: due for scope in 2024 at [facility]  Plan:     (N40.1) Benign non-nodular prostatic hyperplasia with lower urinary tract symptoms  Comment: not taking med due to side effects; wants to see specialist  Plan: Adult Urology  Referral            (I49.3) PVC's (premature ventricular contractions)  Comment: has follow-up with cards next month  Plan:     (Z23) Need for vaccination  Comment:   Plan: TD, PF, 7+YRS (TENIVAC/DECAVAC)              Patient has been advised of split billing requirements and indicates understanding: Yes      COUNSELING:   Reviewed preventive health counseling, as reflected in patient instructions        He reports that he has never smoked. He has never used smokeless tobacco.            Leatha Holliday, [facility]

## 2023-02-16 NOTE — PATIENT INSTRUCTIONS
Recommend to limit NSAIDs (ibuprofen/Advil, naproxen/Aleve) due to heart history; 1-2 x month is fine  Tylenol is safer for the heart  Consider using a pillbox to help you remember to take regularly  We discussed why aspirin statins are recommended  Come back in 2 months for fasting blood work        Preventive Health Recommendations  Male Ages 50 - 64    Yearly exam:             See your health care provider every year in order to  o   Review health changes.   o   Discuss preventive care.    o   Review your medicines if your doctor has prescribed any.   Have a cholesterol test every 5 years, or more frequently if you are at risk for high cholesterol/heart disease.   Have a diabetes test (fasting glucose) every three years. If you are at risk for diabetes, you should have this test more often.   Have a colonoscopy at age 50, or have a yearly FIT test (stool test). These exams will check for colon cancer.    Talk with your health care provider about whether or not a prostate cancer screening test (PSA) is right for you.  You should be tested each year for STDs (sexually transmitted diseases), if you re at risk.     Shots: Get a flu shot each year. Get a tetanus shot every 10 years.     Nutrition:  Eat at least 5 servings of fruits and vegetables daily.   Eat whole-grain bread, whole-wheat pasta and brown rice instead of white grains and rice.   Get adequate Calcium and Vitamin D.     Lifestyle  Exercise for at least 150 minutes a week (30 minutes a day, 5 days a week). This will help you control your weight and prevent disease.   Limit alcohol to one drink per day.   No smoking.   Wear sunscreen to prevent skin cancer.   See your dentist every six months for an exam and cleaning.   See your eye doctor every 1 to 2 years.

## 2023-03-03 NOTE — PROGRESS NOTES
CARDIOLOGY VISIT    REASON FOR VISIT: Coronary calcification, PVCs    SUBJECTIVE:  63-year-old male seen for coronary artery disease and PVCs.  He has a history of dyslipidemia.     He has PVCs dating back to about 2007.  He has been on metoprolol for many years.  Holter monitor 2014 showed less than 1% PVCs.     Coronary CTA in 2015 showed calcium score 9, 57th percentile, mild to moderate stenosis of the LAD.    He has been doing well recently.  He has been off his metoprolol for a while, his palpitations are quite minimal.  He did some shoveling and other activity this winter with no exertional chest pain.  He has some dyspnea on occasion.  Blood pressures not checked at home.  He has been off atorvastatin, he was worried about some side effects he read about, although seemed to tolerate it when he took it in the past.    MEDICATIONS:  Current Outpatient Medications   Medication     acetaminophen (TYLENOL) 325 MG tablet     aspirin EC 81 MG tablet     atorvastatin (LIPITOR) 40 MG tablet     Calcium Carbonate-Vitamin D (CALCIUM + D) 600-200 MG-UNIT per tablet     ibuprofen (ADVIL,MOTRIN) 200 MG tablet     MULTI VITAMIN MENS OR TABS     Probiotic Product ([employer] PO)     RESTASIS 0.05 % ophthalmic emulsion     SB SALINE NOSE NA     No current facility-administered medications for this visit.       ALLERGIES:  Allergies   Allergen Reactions     Flomax [Tamsulosin] Other (See Comments)     Lightheadedness     Penicillins Other (See Comments)     Leg became numb after taking PCN as child       REVIEW OF SYSTEMS:  Constitutional:  No weight loss, fever, chills  HEENT:  Eyes:  No visual loss, blurred vision, double vision or yellow sclerae. No hearing loss, sneezing, congestion, runny nose or sore throat.  Skin:  No rash or itching.  Cardiovascular: per HPI  Respiratory: per HPI  GI:  No anorexia, nausea, vomiting or diarrhea. No abdominal pain or blood.  :  No dysurea, hematuria  Neurologic:  No  headache, paralysis, ataxia, numbness or tingling in the extremities. No change in bowel or bladder control.  Musculoskeletal:  No muscle pain  Hematologic:  No bleeding or bruising.  Lymphatics:  No enlarged nodes. No history of splenectomy.  Endocrine:  No reports of sweating, cold or heat intolerance. No polyuria or polydipsia.  Allergies:  No history of asthma, hives, eczema or rhinitis.    PHYSICAL EXAM:  /82 (BP Location: Right arm, Patient Position: Sitting, Cuff Size: Adult Regular)   Pulse 67   Resp 12   Wt 78.9 kg (174 lb)   SpO2 97%   BMI 24.09 kg/m      Constitutional: awake, alert, no distress  Eyes: PERRL, sclera nonicteric  ENT: trachea midline  Respiratory: Lungs clear  Cardiovascular: Regular rate and rhythm, no murmur, no ectopy  GI: nondistended, nontender, bowel sounds present  Lymph/Hematologic: no lymphadenopathy  Skin: dry, no rash  Musculoskeletal: good muscle tone, strength 5/5 in upper and lower extremities  Neurologic: no focal deficits  Neuropsychiatric: appropriate affact    DATA:  Lab:   Recent Labs   Lab Test 07/16/21  0740 05/28/20  0920 03/23/17  1028 05/21/15  0905   CHOL 234* 190   < > 148   HDL 66 71   < > 57   * 103*   < > 79   TRIG 82 80   < > 62   CHOLHDLRATIO  --   --   --  2.6    < > = values in this interval not displayed.     ASSESSMENT:  63-year-old male seen for coronary calcification and PVCs.  We mostly talked about risk factor management today.  He will have his lipids checked, he seems agreeable to going back on atorvastatin.  Goal LDL would be less than 100, preferably closer to 70.  He will have a stress test on follow-up next year, he did have a mild to moderate stenosis of the mid LAD on previous coronary CTA.    RECOMMENDATIONS:  1.  Coronary calcification  -Stress test in 1 year on follow-up    2.  Dyslipidemia  -Check fasting lipids now, then we will likely restart atorvastatin 40 mg daily    3.  PVCs  -Minimal symptoms, no need for  treatment    Follow-up in 1 year with CHAITANYA after stress echo.  Pradeep Chandra MD  Cardiology - [facility]  Pager:  [phone]  Text Page  March 6, 2023    Addendum:  Lipids today show cholesterol 240, .  Will start rosuvastatin 20 mg daily.    Pradeep Chandra MD  Cardiology [facility]  Pager: [phone]  Text Page  March 6, 2023

## 2023-03-06 ENCOUNTER — LAB (OUTPATIENT)
Dept: LAB | Facility: CLINIC | Age: 63
End: 2023-03-06
Payer: COMMERCIAL

## 2023-03-06 ENCOUNTER — OFFICE VISIT (OUTPATIENT)
Dept: CARDIOLOGY | Facility: CLINIC | Age: 63
End: 2023-03-06
Payer: COMMERCIAL

## 2023-03-06 VITALS
BODY MASS INDEX: 24.09 KG/M2 | WEIGHT: 174 LBS | DIASTOLIC BLOOD PRESSURE: 82 MMHG | HEART RATE: 67 BPM | OXYGEN SATURATION: 97 % | RESPIRATION RATE: 12 BRPM | SYSTOLIC BLOOD PRESSURE: 129 MMHG

## 2023-03-06 DIAGNOSIS — E78.2 MIXED HYPERLIPIDEMIA: Primary | Chronic | ICD-10-CM

## 2023-03-06 DIAGNOSIS — E78.5 DYSLIPIDEMIA: ICD-10-CM

## 2023-03-06 DIAGNOSIS — N40.1 BENIGN NON-NODULAR PROSTATIC HYPERPLASIA WITH LOWER URINARY TRACT SYMPTOMS: ICD-10-CM

## 2023-03-06 DIAGNOSIS — R06.09 DYSPNEA ON EXERTION: ICD-10-CM

## 2023-03-06 DIAGNOSIS — I25.10 CORONARY ARTERY CALCIFICATION: ICD-10-CM

## 2023-03-06 DIAGNOSIS — E78.5 DYSLIPIDEMIA: Primary | ICD-10-CM

## 2023-03-06 DIAGNOSIS — E78.2 MIXED HYPERLIPIDEMIA: ICD-10-CM

## 2023-03-06 LAB
ANION GAP SERPL CALCULATED.3IONS-SCNC: 9 MMOL/L (ref 7–15)
BUN SERPL-MCNC: 21.9 MG/DL (ref 8–23)
CALCIUM SERPL-MCNC: 9.5 MG/DL (ref 8.8–10.2)
CHLORIDE SERPL-SCNC: 105 MMOL/L (ref 98–107)
CHOLEST SERPL-MCNC: 240 MG/DL
CREAT SERPL-MCNC: 1.01 MG/DL (ref 0.67–1.17)
DEPRECATED HCO3 PLAS-SCNC: 26 MMOL/L (ref 22–29)
ERYTHROCYTE [DISTWIDTH] IN BLOOD BY AUTOMATED COUNT: 13.3 % (ref 10–15)
GFR SERPL CREATININE-BSD FRML MDRD: 85 ML/MIN/1.73M2
GLUCOSE SERPL-MCNC: 108 MG/DL (ref 70–99)
HCT VFR BLD AUTO: 49.1 % (ref 40–53)
HDLC SERPL-MCNC: 59 MG/DL
HGB BLD-MCNC: 15.7 G/DL (ref 13.3–17.7)
LDLC SERPL CALC-MCNC: 163 MG/DL
MCH RBC QN AUTO: 28.8 PG (ref 26.5–33)
MCHC RBC AUTO-ENTMCNC: 32 G/DL (ref 31.5–36.5)
MCV RBC AUTO: 90 FL (ref 78–100)
NONHDLC SERPL-MCNC: 181 MG/DL
PLATELET # BLD AUTO: 229 10E3/UL (ref 150–450)
POTASSIUM SERPL-SCNC: 4.4 MMOL/L (ref 3.4–5.3)
PSA SERPL-MCNC: 1.27 NG/ML (ref 0–4.5)
RBC # BLD AUTO: 5.45 10E6/UL (ref 4.4–5.9)
SODIUM SERPL-SCNC: 140 MMOL/L (ref 136–145)
TRIGL SERPL-MCNC: 92 MG/DL
WBC # BLD AUTO: 6.9 10E3/UL (ref 4–11)

## 2023-03-06 PROCEDURE — 80048 BASIC METABOLIC PNL TOTAL CA: CPT

## 2023-03-06 PROCEDURE — 80061 LIPID PANEL: CPT

## 2023-03-06 PROCEDURE — G0103 PSA SCREENING: HCPCS

## 2023-03-06 PROCEDURE — 85027 COMPLETE CBC AUTOMATED: CPT

## 2023-03-06 PROCEDURE — 99214 OFFICE O/P EST MOD 30 MIN: CPT | Performed by: INTERNAL MEDICINE

## 2023-03-06 PROCEDURE — 36415 COLL VENOUS BLD VENIPUNCTURE: CPT

## 2023-03-06 RX ORDER — ROSUVASTATIN CALCIUM 20 MG/1
20 TABLET, COATED ORAL DAILY
Qty: 30 TABLET | Refills: 11 | Status: SHIPPED | OUTPATIENT
Start: 2023-03-06

## 2023-03-06 NOTE — PATIENT INSTRUCTIONS
Check fasting lipids.  Will decide on statin dose based on LDL level.    We will see you back in one year with a stress test.

## 2023-03-06 NOTE — LETTER
3/6/2023    Leatha Holliday, DO  [address] [facility] [address]    RE: Justin Rivas       Dear Colleague,     I had the pleasure of seeing Justin Rivas in the [facility].  CARDIOLOGY VISIT    REASON FOR VISIT: Coronary calcification, PVCs    SUBJECTIVE:  63-year-old male seen for coronary artery disease and PVCs.  He has a history of dyslipidemia.     He has PVCs dating back to about 2007.  He has been on metoprolol for many years.  Holter monitor 2014 showed less than 1% PVCs.     Coronary CTA in 2015 showed calcium score 9, 57th percentile, mild to moderate stenosis of the LAD.    He has been doing well recently.  He has been off his metoprolol for a while, his palpitations are quite minimal.  He did some shoveling and other activity this winter with no exertional chest pain.  He has some dyspnea on occasion.  Blood pressures not checked at home.  He has been off atorvastatin, he was worried about some side effects he read about, although seemed to tolerate it when he took it in the past.    MEDICATIONS:  Current Outpatient Medications   Medication     acetaminophen (TYLENOL) 325 MG tablet     aspirin EC 81 MG tablet     atorvastatin (LIPITOR) 40 MG tablet     Calcium Carbonate-Vitamin D (CALCIUM + D) 600-200 MG-UNIT per tablet     ibuprofen (ADVIL,MOTRIN) 200 MG tablet     MULTI VITAMIN MENS OR TABS     Probiotic Product (Pearescope PO)     RESTASIS 0.05 % ophthalmic emulsion     SB SALINE NOSE NA     No current facility-administered medications for this visit.       ALLERGIES:  Allergies   Allergen Reactions     Flomax [Tamsulosin] Other (See Comments)     Lightheadedness     Penicillins Other (See Comments)     Leg became numb after taking PCN as child       REVIEW OF SYSTEMS:  Constitutional:  No weight loss, fever, chills  HEENT:  Eyes:  No visual loss, blurred vision, double vision or yellow sclerae. No hearing loss, sneezing, congestion, runny nose or sore  throat.  Skin:  No rash or itching.  Cardiovascular: per HPI  Respiratory: per HPI  GI:  No anorexia, nausea, vomiting or diarrhea. No abdominal pain or blood.  :  No dysurea, hematuria  Neurologic:  No headache, paralysis, ataxia, numbness or tingling in the extremities. No change in bowel or bladder control.  Musculoskeletal:  No muscle pain  Hematologic:  No bleeding or bruising.  Lymphatics:  No enlarged nodes. No history of splenectomy.  Endocrine:  No reports of sweating, cold or heat intolerance. No polyuria or polydipsia.  Allergies:  No history of asthma, hives, eczema or rhinitis.    PHYSICAL EXAM:  /82 (BP Location: Right arm, Patient Position: Sitting, Cuff Size: Adult Regular)   Pulse 67   Resp 12   Wt 78.9 kg (174 lb)   SpO2 97%   BMI 24.09 kg/m      Constitutional: awake, alert, no distress  Eyes: PERRL, sclera nonicteric  ENT: trachea midline  Respiratory: Lungs clear  Cardiovascular: Regular rate and rhythm, no murmur, no ectopy  GI: nondistended, nontender, bowel sounds present  Lymph/Hematologic: no lymphadenopathy  Skin: dry, no rash  Musculoskeletal: good muscle tone, strength 5/5 in upper and lower extremities  Neurologic: no focal deficits  Neuropsychiatric: appropriate affact    DATA:  Lab:   Recent Labs   Lab Test 07/16/21  0740 05/28/20  0920 03/23/17  1028 05/21/15  0905   CHOL 234* 190   < > 148   HDL 66 71   < > 57   * 103*   < > 79   TRIG 82 80   < > 62   CHOLHDLRATIO  --   --   --  2.6    < > = values in this interval not displayed.     ASSESSMENT:  63-year-old male seen for coronary calcification and PVCs.  We mostly talked about risk factor management today.  He will have his lipids checked, he seems agreeable to going back on atorvastatin.  Goal LDL would be less than 100, preferably closer to 70.  He will have a stress test on follow-up next year, he did have a mild to moderate stenosis of the mid LAD on previous coronary CTA.    RECOMMENDATIONS:  1.  Coronary  calcification  -Stress test in 1 year on follow-up    2.  Dyslipidemia  -Check fasting lipids now, then we will likely restart atorvastatin 40 mg daily    3.  PVCs  -Minimal symptoms, no need for treatment    Follow-up in 1 year with CHAITANYA after stress echo.  Pradeep Chandra MD  Cardiology - [facility]  Pager:  [phone]  Text Page  March 6, 2023    Addendum:  Lipids today show cholesterol 240, .  Will start rosuvastatin 20 mg daily.    Pradeep Chandra MD  Cardiology [facility]  Pager: [phone]  Text Page  March 6, 2023    Thank you for allowing me to participate in the care of your patient.      Sincerely,     Pradeep Chandra MD     [facility]  cc:   No referring provider defined for this encounter.

## 2023-03-06 NOTE — RESULT ENCOUNTER NOTE
Disc with patient. He will start rosuvastatin. States he already has lab appt on 4/25/23 for PCP. Will add on fasting labs to that appt. Script called in to pharmacy. Orders for labs placed.

## 2023-04-14 ENCOUNTER — HOSPITAL ENCOUNTER (OUTPATIENT)
Dept: CARDIOLOGY | Facility: CLINIC | Age: 63
Discharge: HOME | End: 2023-04-14
Attending: INTERNAL MEDICINE | Admitting: INTERNAL MEDICINE
Payer: COMMERCIAL

## 2023-04-14 DIAGNOSIS — R06.09 DYSPNEA ON EXERTION: ICD-10-CM

## 2023-04-14 DIAGNOSIS — I25.10 CORONARY ARTERY CALCIFICATION: ICD-10-CM

## 2023-04-14 PROCEDURE — 93350 STRESS TTE ONLY: CPT | Mod: 26 | Performed by: INTERNAL MEDICINE

## 2023-04-14 PROCEDURE — 93018 CV STRESS TEST I&R ONLY: CPT | Performed by: INTERNAL MEDICINE

## 2023-04-14 PROCEDURE — 93325 DOPPLER ECHO COLOR FLOW MAPG: CPT | Mod: 26 | Performed by: INTERNAL MEDICINE

## 2023-04-14 PROCEDURE — 93325 DOPPLER ECHO COLOR FLOW MAPG: CPT | Mod: TC

## 2023-04-14 PROCEDURE — 255N000002 HC RX 255 OP 636: Performed by: INTERNAL MEDICINE

## 2023-04-14 PROCEDURE — 93016 CV STRESS TEST SUPVJ ONLY: CPT | Performed by: INTERNAL MEDICINE

## 2023-04-14 PROCEDURE — 93017 CV STRESS TEST TRACING ONLY: CPT

## 2023-04-14 PROCEDURE — 93321 DOPPLER ECHO F-UP/LMTD STD: CPT | Mod: 26 | Performed by: INTERNAL MEDICINE

## 2023-04-14 RX ADMIN — HUMAN ALBUMIN MICROSPHERES AND PERFLUTREN 4 ML: 10; .22 INJECTION, SOLUTION INTRAVENOUS at 08:38

## 2024-04-11 NOTE — NURSING NOTE
Initial /75  Pulse 58  Temp 97.6  F (36.4  C) (Tympanic) Estimated body mass index is 24.06 kg/(m^2) as calculated from the following:    Height as of 11/17/16: 1.829 m (6').    Weight as of 11/17/16: 80.5 kg (177 lb 6.4 oz). .    Natalie Au LPN     Parents

## 2025-01-28 ENCOUNTER — TRANSFERRED RECORDS (OUTPATIENT)
Dept: HEALTH INFORMATION MANAGEMENT | Facility: CLINIC | Age: 65
End: 2025-01-28

## 2025-02-05 PROBLEM — D36.9 TUBULAR ADENOMA: Status: ACTIVE | Noted: 2025-02-05

## 2025-02-07 ENCOUNTER — TRANSFERRED RECORDS (OUTPATIENT)
Dept: HEALTH INFORMATION MANAGEMENT | Facility: CLINIC | Age: 65
End: 2025-02-07

## 2025-02-21 NOTE — MR AVS SNAPSHOT
After Visit Summary   6/26/2018    Justin Rivas    MRN: [id number]           Patient Information     Date Of Birth          1960        Visit Information        Provider Department      6/26/2018 3:00 PM Jose Rod MD [facility]        Today's Diagnoses     Coronary artery disease due to calcified coronary lesion    -  1    PVC's (premature ventricular contractions)           Follow-ups after your visit        Additional Services     Follow-Up with Cardiologist           Follow-Up with Cardiologist                 Future tests that were ordered for you today     Open Future Orders        Priority Expected Expires Ordered    Lipid Profile Routine 6/26/2019 6/26/2019 6/26/2018    ALT Routine 6/26/2019 6/26/2019 6/26/2018    Follow-Up with Cardiologist Routine 6/26/2019 6/27/2019 6/26/2018    Follow-Up with Cardiologist Routine 6/26/2019 6/27/2019 6/26/2018            Who to contact     If you have questions or need follow up information about today's clinic visit or your schedule please contact [facility] directly at [phone].  Normal or non-critical lab and imaging results will be communicated to you by Reverthart, letter or phone within 4 business days after the clinic has received the results. If you do not hear from us within 7 days, please contact the clinic through Reverthart or phone. If you have a critical or abnormal lab result, we will notify you by phone as soon as possible.  Submit refill requests through Lockitron or call your pharmacy and they will forward the refill request to us. Please allow 3 business days for your refill to be completed.          Additional Information About Your Visit        MyChart Information     Lockitron gives you secure access to your electronic health record. If you see a primary care provider, you can also send messages to your care team and make appointments. If you have  INPATIENT PROGRESS NOTE    Nadia Campos is a 88 Rex Montanez CNP        acetaminophen (TYLENOL)  questions, please call your primary care clinic.  If you do not have a primary care provider, please call [phone] and they will assist you.        Care EveryWhere ID     This is your Care EveryWhere ID. This could be used by other organizations to access your [facility] medical records  [id number]        Your Vitals Were     Pulse Pulse Oximetry BMI (Body Mass Index)             69 96% 24.34 kg/m2          Blood Pressure from Last 3 Encounters:   06/26/18 118/73   04/26/18 134/67   04/20/18 151/69    Weight from Last 3 Encounters:   06/26/18 79.7 kg (175 lb 12.8 oz)   04/26/18 80.7 kg (178 lb)   04/20/18 78.5 kg (173 lb)              We Performed the Following     Follow-Up with Cardiologist        Primary Care Provider Office Phone # Fax #    Leatha Underwood DO Mg [phone] [address] [address]        Equal Access to Services     VINEET SCHMITZ : Hadii aad ku hadasho Soomaali, waaxda luqadaha, qaybta kaalmada adeegyada, waxay idiin hayabdifatahn tita black . So [facility] [phone].    ATENCIÓN: Si habla español, tiene a clement disposición servicios gratuitos de asistencia lingüística. Teresaame al [phone].    We comply with applicable federal civil rights laws and [place] laws. We do not discriminate on the basis of race, color, national origin, age, disability, sex, sexual orientation, or gender identity.            Thank you!     Thank you for choosing [facility]  for your care. Our goal is always to provide you with excellent care. Hearing back from our patients is one way we can continue to improve our services. Please take a few minutes to complete the written survey that you may receive in the mail after your visit with us. Thank you!             Your Updated Medication List - Protect others around you: Learn how to safely use, store and throw away your medicines at www.disposemymeds.org.          This list is accurate as of  6/26/18  3:34 PM.  Always use your most recent med list.                   Brand Name Dispense Instructions for use Diagnosis    acetaminophen 325 MG tablet    TYLENOL     Take 2 tablets by mouth every 6 hours as needed.        aspirin 81 MG EC tablet      Take 1 tablet (81 mg) by mouth daily    CAD (coronary artery disease)       atorvastatin 20 MG tablet    LIPITOR    90 tablet    Take 1 tablet (20 mg) by mouth At Bedtime    Mixed hyperlipidemia       calcium + D 600-200 MG-UNIT Tabs   Generic drug:  calcium carbonate-vitamin D     100 tablet    Take 1 tablet by mouth daily.        fish oil-omega-3 fatty acids 1000 MG capsule      1 CAPSULE ORALLY DAILY        ibuprofen 200 MG tablet    ADVIL/MOTRIN     Take 400 mg by mouth every 4 hours as needed.        metoprolol succinate 25 MG 24 hr tablet    TOPROL-XL    90 tablet    Take 1 tablet (25 mg) by mouth daily And twice daily as needed    PVC's (premature ventricular contractions)       MULTI vitamin  MENS Tabs      one daily        [place] HEALTH PO      1 capsule daily        RESTASIS 0.05 % ophthalmic emulsion   Generic drug:  cycloSPORINE     180 each    INSTILL 1 DROP IN BOTH EYES TWICE A DAY    Chronic dryness of both eyes       SB SALINE NOSE NA      Spray 2 sprays into both nostrils 2 times daily BID two sprays, per Dr Arce.    Dysuria       terazosin 2 MG capsule    HYTRIN    180 capsule    Take 1 capsule (2 mg) by mouth 2 times daily    Benign non-nodular prostatic hyperplasia with lower urinary tract symptoms       tiZANidine 2 MG tablet    ZANAFLEX    30 tablet    Take 1-2 tablets (2-4 mg) by mouth 3 times daily    Chronic bilateral low back pain without sciatica          [facility]* [address] [id number] [phone] Limited Transthoracic Echocardiogram (TTE) Patient: Nadia Campos     Study Date/Time:     2025 11:13AM MRN:     [id number]                FIN#:                [id number] :     Michael Boland MD  *Referring Physician:* Michael Boland  *Attending Physician:* Jj Santana  *Diagnostic Physician:* Atiya Hatch MD *Sonographer:* KIRTI Norman  *Fellow:* Linda Lanier  ------------------------------------------------------------------------------ Mechelle Mcdaniels MD Signed by: Mechelle Mcdaniels 

## 2025-03-11 ENCOUNTER — TRANSFERRED RECORDS (OUTPATIENT)
Dept: HEALTH INFORMATION MANAGEMENT | Facility: CLINIC | Age: 65
End: 2025-03-11